# Patient Record
Sex: FEMALE | Race: WHITE
[De-identification: names, ages, dates, MRNs, and addresses within clinical notes are randomized per-mention and may not be internally consistent; named-entity substitution may affect disease eponyms.]

---

## 2019-12-01 ENCOUNTER — HOSPITAL ENCOUNTER (INPATIENT)
Dept: HOSPITAL 47 - EC | Age: 64
LOS: 1 days | Discharge: HOME | DRG: 247 | End: 2019-12-02
Attending: HOSPITALIST | Admitting: HOSPITALIST
Payer: COMMERCIAL

## 2019-12-01 VITALS — BODY MASS INDEX: 23.8 KG/M2

## 2019-12-01 DIAGNOSIS — E78.5: ICD-10-CM

## 2019-12-01 DIAGNOSIS — I21.4: Primary | ICD-10-CM

## 2019-12-01 DIAGNOSIS — Z83.2: ICD-10-CM

## 2019-12-01 DIAGNOSIS — Z91.040: ICD-10-CM

## 2019-12-01 DIAGNOSIS — Z98.51: ICD-10-CM

## 2019-12-01 DIAGNOSIS — Z79.02: ICD-10-CM

## 2019-12-01 DIAGNOSIS — Z85.3: ICD-10-CM

## 2019-12-01 DIAGNOSIS — Z88.5: ICD-10-CM

## 2019-12-01 DIAGNOSIS — I25.2: ICD-10-CM

## 2019-12-01 DIAGNOSIS — F17.200: ICD-10-CM

## 2019-12-01 DIAGNOSIS — I25.10: ICD-10-CM

## 2019-12-01 DIAGNOSIS — Z82.49: ICD-10-CM

## 2019-12-01 DIAGNOSIS — Z80.51: ICD-10-CM

## 2019-12-01 DIAGNOSIS — Z88.2: ICD-10-CM

## 2019-12-01 DIAGNOSIS — Z95.5: ICD-10-CM

## 2019-12-01 DIAGNOSIS — I10: ICD-10-CM

## 2019-12-01 LAB
ALBUMIN SERPL-MCNC: 4.3 G/DL (ref 3.5–5)
ALP SERPL-CCNC: 128 U/L (ref 38–126)
ALT SERPL-CCNC: 21 U/L (ref 9–52)
ANION GAP SERPL CALC-SCNC: 6 MMOL/L
APTT BLD: 26.1 SEC (ref 22–30)
AST SERPL-CCNC: 22 U/L (ref 14–36)
BASOPHILS # BLD AUTO: 0 K/UL (ref 0–0.2)
BASOPHILS NFR BLD AUTO: 0 %
BUN SERPL-SCNC: 15 MG/DL (ref 7–17)
CALCIUM SPEC-MCNC: 10.4 MG/DL (ref 8.4–10.2)
CHLORIDE SERPL-SCNC: 107 MMOL/L (ref 98–107)
CO2 SERPL-SCNC: 26 MMOL/L (ref 22–30)
EOSINOPHIL # BLD AUTO: 0.3 K/UL (ref 0–0.7)
EOSINOPHIL NFR BLD AUTO: 3 %
ERYTHROCYTE [DISTWIDTH] IN BLOOD BY AUTOMATED COUNT: 4.74 M/UL (ref 3.8–5.4)
ERYTHROCYTE [DISTWIDTH] IN BLOOD: 13.4 % (ref 11.5–15.5)
GLUCOSE BLD-MCNC: 123 MG/DL (ref 75–99)
GLUCOSE SERPL-MCNC: 106 MG/DL (ref 74–99)
HCT VFR BLD AUTO: 44.5 % (ref 34–46)
HGB BLD-MCNC: 14.8 GM/DL (ref 11.4–16)
INR PPP: 0.9 (ref ?–1.2)
LYMPHOCYTES # SPEC AUTO: 2.5 K/UL (ref 1–4.8)
LYMPHOCYTES NFR SPEC AUTO: 25 %
MAGNESIUM SPEC-SCNC: 2.1 MG/DL (ref 1.6–2.3)
MCH RBC QN AUTO: 31.2 PG (ref 25–35)
MCHC RBC AUTO-ENTMCNC: 33.3 G/DL (ref 31–37)
MCV RBC AUTO: 93.7 FL (ref 80–100)
MONOCYTES # BLD AUTO: 0.5 K/UL (ref 0–1)
MONOCYTES NFR BLD AUTO: 5 %
NEUTROPHILS # BLD AUTO: 6.4 K/UL (ref 1.3–7.7)
NEUTROPHILS NFR BLD AUTO: 65 %
PLATELET # BLD AUTO: 228 K/UL (ref 150–450)
POTASSIUM SERPL-SCNC: 4.2 MMOL/L (ref 3.5–5.1)
PROT SERPL-MCNC: 7.2 G/DL (ref 6.3–8.2)
PT BLD: 10 SEC (ref 9–12)
SODIUM SERPL-SCNC: 139 MMOL/L (ref 137–145)
WBC # BLD AUTO: 9.9 K/UL (ref 3.8–10.6)

## 2019-12-01 PROCEDURE — 36415 COLL VENOUS BLD VENIPUNCTURE: CPT

## 2019-12-01 PROCEDURE — 85730 THROMBOPLASTIN TIME PARTIAL: CPT

## 2019-12-01 PROCEDURE — 4A023N7 MEASUREMENT OF CARDIAC SAMPLING AND PRESSURE, LEFT HEART, PERCUTANEOUS APPROACH: ICD-10-PCS

## 2019-12-01 PROCEDURE — 027034Z DILATION OF CORONARY ARTERY, ONE ARTERY WITH DRUG-ELUTING INTRALUMINAL DEVICE, PERCUTANEOUS APPROACH: ICD-10-PCS

## 2019-12-01 PROCEDURE — 99285 EMERGENCY DEPT VISIT HI MDM: CPT

## 2019-12-01 PROCEDURE — 96365 THER/PROPH/DIAG IV INF INIT: CPT

## 2019-12-01 PROCEDURE — 71046 X-RAY EXAM CHEST 2 VIEWS: CPT

## 2019-12-01 PROCEDURE — 80053 COMPREHEN METABOLIC PANEL: CPT

## 2019-12-01 PROCEDURE — 83735 ASSAY OF MAGNESIUM: CPT

## 2019-12-01 PROCEDURE — 96375 TX/PRO/DX INJ NEW DRUG ADDON: CPT

## 2019-12-01 PROCEDURE — 96376 TX/PRO/DX INJ SAME DRUG ADON: CPT

## 2019-12-01 PROCEDURE — 93306 TTE W/DOPPLER COMPLETE: CPT

## 2019-12-01 PROCEDURE — B2111ZZ FLUOROSCOPY OF MULTIPLE CORONARY ARTERIES USING LOW OSMOLAR CONTRAST: ICD-10-PCS

## 2019-12-01 PROCEDURE — 80061 LIPID PANEL: CPT

## 2019-12-01 PROCEDURE — 83880 ASSAY OF NATRIURETIC PEPTIDE: CPT

## 2019-12-01 PROCEDURE — 85610 PROTHROMBIN TIME: CPT

## 2019-12-01 PROCEDURE — 84484 ASSAY OF TROPONIN QUANT: CPT

## 2019-12-01 PROCEDURE — 93005 ELECTROCARDIOGRAM TRACING: CPT

## 2019-12-01 PROCEDURE — 93458 L HRT ARTERY/VENTRICLE ANGIO: CPT

## 2019-12-01 PROCEDURE — 82565 ASSAY OF CREATININE: CPT

## 2019-12-01 PROCEDURE — 85025 COMPLETE CBC W/AUTO DIFF WBC: CPT

## 2019-12-01 RX ADMIN — MIDAZOLAM ONE MG: 1 INJECTION INTRAMUSCULAR; INTRAVENOUS at 11:42

## 2019-12-01 RX ADMIN — NITROGLYCERIN ONE MCG: 10 INJECTION INTRAVENOUS at 11:52

## 2019-12-01 RX ADMIN — MIDAZOLAM ONE MG: 1 INJECTION INTRAMUSCULAR; INTRAVENOUS at 10:51

## 2019-12-01 RX ADMIN — NITROGLYCERIN ONE MCG: 10 INJECTION INTRAVENOUS at 11:46

## 2019-12-01 NOTE — PTCA
PERCUTANEOUSTRANS CORORONARY ANGIOGRAPHY



DATE OF SERVICE:

December 1, 2019



PERFORMING PHYSICIAN:

Marquis Mejias M.D.



PROCEDURE PERFORMED:

Successful stenting of the PDA branch of the RCA using 2.5 x 12 mm Xience ANA with an

excellent angiographic result and reduction of stenosis from 99% to 0%.



INDICATION:

This is a 64-year-old female patient with coronary artery disease as well as

hypertension and dyslipidemia who presented to the hospital with chest discomfort and

ruled in for acute non-ST-elevation myocardial infarction.  She underwent a heart

catheterization by Dr. Adkins and was found to have critical disease involving the PDA

branch of the RCA.



Percutaneous coronary intervention of the RCA was advised.



APPROACH:

Right common femoral artery.



COMPLICATION:

None.



LEVEL OF SEDATION:

Moderate with sedation length of 24 minutes.



PROCEDURE DESCRIPTION:

Please refer to the diagnostic heart catheterization that was performed by Dr. Adkins

earlier today.



Anticoagulation was initiated using Angiomax.  Subsequently I did engage the RCA using

JR4 guide.  I did wire the PDA branch using a Whisper J wire.  Predilatation was

performed using 2.0 x 8 mm balloon before I deployed a 2.5 x 12 mm Xience ANA where the

stent was positioned under fluoroscopy guidance and deployed under 20 atmospheres for

30 seconds.  The following angiogram showed excellent angiographic results with

reduction of stenosis from 99% to 0%.  The procedure was completed without any

complication.



POSTPROCEDURE MANAGEMENT:

1. Dual anti-platelet therapy.

2. Risk factors modifications.

3. Follow up with the patient.





MMJESSICA / JEFFERYN: [id number] / Job#: [id number]

## 2019-12-01 NOTE — XR
EXAMINATION TYPE: XR chest 2V

 

DATE OF EXAM: 12/1/2019

 

COMPARISON: NONE

 

HISTORY: Chest pain

 

TECHNIQUE:  Frontal and lateral views of the chest are obtained.

 

FINDINGS:  Heart and mediastinum are normal. Lungs are clear. Diaphragm is normal. The bony thorax is
 intact.

 

IMPRESSION:  Normal chest.

## 2019-12-01 NOTE — CONS
CONSULTATION



CHIEF COMPLAINT:

Chest pain.



Ms. Garcia is a 64-year-old lady with history of coronary artery disease, status post

prior angioplasty many years ago at [facility], who does not follow with a

physician regularly and does not take any medications.  Comes in complaining of chest

pain.  She describes it as mild to moderate precordial chest pressure with left arm

discomfort.  It comes on with exertion and is relieved with rest.  The chest pain was

coming on when she was going up a flight of stairs.  On her initial presentation, the

EKG showed sinus tachycardia with ST-T wave changes suggestive of ischemia.  Had a

chest x-ray that was unremarkable and her labs showed that the troponin was elevated at

0.04.  Creatinine was normal at 0.8 and hemoglobin was normal at 14.8.  Given her

typical symptoms and the elevated troponin, suggestive of acute non ST-segment

elevation MI, I advised the patient to undergo cardiac catheterization.  She had been

explained of risks, benefits and alternatives, understood and accepted.



PAST MEDICAL HISTORY:

Significant for coronary artery disease.



MEDICATIONS:

Include vitamin D, calcium, and Tylenol.



ALLERGIES:

THE PATIENT IS ALLERGIC TO LATEX, SULFA, TRAMADOL AND PRESSURE BANDAGE.



FAMILY HISTORY:

Negative for premature coronary artery disease.



SOCIAL HISTORY:

Significant for smoking, EtOH abuse or drug abuse.



REVIEW OF SYSTEMS:

HEENT is unremarkable. Cardiac as described above.  Respiratory as described above.  GI

negative. : Negative. Allergy/Immunology: Negative.

SKIN: Negative.  Musculoskeletal significant for arthritis.  Psychosocial negative.

Endocrine negative.  Hematological negative.  Derm negative.  Constitutional negative.

Oncological negative.



Rest of the system review is not relevant.



PHYSICAL EXAM:

Comfortable at rest.  Vital signs are stable.  There is no jugular venous distention.

Carotid upstroke is normal.  There is no bruit.  Chest exam reveals good air entry

bilaterally.  Heart exam reveals first and second heart sounds.  Systolic murmur at the

left lower sternal border.  Abdomen is soft.  Exam of extremities did not reveal edema.

Peripheral pulses are felt.



ASSESSMENT:

Acute non ST-segment elevation myocardial infarction.



PLAN:

Patient will undergo cardiac catheterization for further evaluation.  She will be

treated with aspirin, beta blockers, nitrates and statin.  I will check a lipid profile

on her.





MMODL / IJN: [id number] / Job#: [id number]

## 2019-12-01 NOTE — P.HPIM
History of Present Illness


H&P Date: 12/01/19


Chief Complaint: Chest pain





Mrs. Garcia to 64-year-old female with a past medical history of coronary 

artery disease status post stenting in 2009, breast cancer in remission coming 

to the hospital with a chief complaint of substernal chest pain.  Patient states

that she has been having chest pain substernal mostly on the left side squeezing

type of pain that is radiating to her left arm and shoulder for the past 3 days.

 Over the past 2-3 hours patient pain was very severe that she had to come into 

the emergency department.  Patient states that she had similar kind of pain when

she was diagnosed with MI in 2009 and had stenting.  She states that she was 

taking aspirin for a few years but currently is not taking any medications.  

Patient denies having any shortness of breath, diaphoresis nausea and vomiting 

with the chest pains.  Patient denies having any cough or difficulty breathing. 

No abdominal pain nausea vomiting or diarrhea.  No dysuria or hematuria.  No 

headaches dizziness or loss of consciousness or syncopal episodes.





In the emergency patient had labs done showing an elevated troponin at 0.044, so

cardiology was consulted.  Patient was taken for cardiac catheterization and 

stenting of the right coronary artery was done by Dr. Sears.  Currently the 

patient is on the general medical floors, eating her dinner.  Patient is chest 

pain-free.











Review of Systems





REVIEW OF SYSTEMS:


PSYCH: No anxiety or depression


NEURO:No c/o weakness of the extremties, No facial droop, No speech 

abnormalities.


VASCULAR: Peripheral nervous system within the normal limits no edema


HEMATOLOGIC: No history of easy bleeding and bruising .  No recent infections .


RESPIRATORY: No cough, No SOB, No chest discomfort. 


IMMUNE: No infections


INTEGUMENT: no rashes


OPHTHALMOLOGIC: No blurry vision and no eye discharge


: No dysuria or hematuria


GYN: No bleeding PV


CARDIAC: As per HPI


MUSCULOSKELETAL : No Aches or pains in the joints or muscles. 


GI: No abdominal pain, Nausea or vomiting. No constipation or diarrhea. 








All 13 review of systems are negative except for the ones mentioned above





Past Medical History


Past Medical History: Coronary Artery Disease (CAD), Cancer


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 No LEFT 

ARM


History of Any Multi-Drug Resistant Organisms: None Reported


Past Surgical History: Breast Surgery, Heart Catheterization With Stent, 

Tonsillectomy, Tubal Ligation


Additional Past Surgical History / Comment(s): D & C. LEFT STEREOTACTIC  BREAST 

BX  (3/18/16)


Past Anesthesia/Blood Transfusion Reactions: No Reported Reaction


Additional Past Anesthesia/Blood Transfusion Reaction / Comment(s): HAS NEVER 

HAD GENERAL ANESTHESIA


Date of Last Stent Placement:: 2009


Past Psychological History: No Psychological Hx Reported


Smoking Status: Current every day smoker


Past Alcohol Use History: Rare


Past Drug Use History: None Reported





- Past Family History


  ** Father


Additional Family Medical History / Comment(s): HEMPHILIA A.





  ** Daughter(s)


Additional Family Medical History / Comment(s): HEMOPHILIA A





  ** Mother


Family Medical History: Cancer, Coronary Artery Disease (CAD)


Additional Family Medical History / Comment(s): KIDNEY CANCER





Medications and Allergies


                                Home Medications











 Medication  Instructions  Recorded  Confirmed  Type


 


Acetaminophen/Diphenhydramine 2 tab PO HS PRN 12/01/19 12/01/19 History





[Tylenol -25mg]    


 


Calcium Carbonate [Calcium] 1,200 mg PO DAILY 12/01/19 12/01/19 History


 


Cholecalciferol [Vitamin D3 (25 1,000 unit PO DAILY 12/01/19 12/01/19 History





Mcg = 1000 Iu)]    








                                    Allergies











Allergy/AdvReac Type Severity Reaction Status Date / Time


 


Latex, Natural Rubber Allergy  Rash/Hives Verified 12/01/19 07:41


 


Sulfa (Sulfonamide Allergy  Rash/Hives. Verified 12/01/19 07:41





Antibiotics)   ITCHING  


 


tramadol AdvReac  Nausea & Verified 12/01/19 07:41





   Vomiting  


 


pressure bandage Allergy  Itching/soraya Uncoded 12/01/19 07:41





   h  














Physical Exam


Vitals: 


                                   Vital Signs











  Temp Pulse Pulse Resp BP BP Pulse Ox


 


 12/01/19 15:22  98 F   70  16   96/60  98


 


 12/01/19 14:05    61  16   93/58 


 


 12/01/19 13:35    65  16   97/66 


 


 12/01/19 13:05    76  16   111/68  99


 


 12/01/19 12:50    76  16   115/72  96


 


 12/01/19 12:35    74  16   97/60  98


 


 12/01/19 12:20  97.7 F   65  16   100/65  97


 


 12/01/19 08:00  97.8 F   77  16   122/76  96


 


 12/01/19 04:37   88   18  155/60   98


 


 12/01/19 04:00     18   


 


 12/01/19 03:54   82   18  151/84   97


 


 12/01/19 03:41  97.8 F   90  18   155/81  97


 


 12/01/19 03:25   92   18  123/82   98


 


 12/01/19 02:20  97.8 F  108 H   18  169/92   99








                                Intake and Output











 12/01/19 12/01/19 12/01/19





 06:59 14:59 22:59


 


Intake Total  270 


 


Balance  270 


 


Intake:   


 


  IV  70 


 


  Oral  200 


 


Other:   


 


  Voiding Method Toilet Toilet 


 


  # Voids  2 


 


  Weight 65.1 kg  65.1 kg














GEN. APPEARANCE: alert, in no apparent distress


HEAD EXAM:  atraumatic, normocephalic, normal inspection


EYE EXAM: normal appearance, PERRL, EOMI. no pallor.  No icterus.


ENT EXAM:  normal exam, mucous membranes moist


NECK EXAM:  normal inspection.  No lymphadenopathy


RESPIRATORY EXAM:  normal lung sounds bilaterally.  No wheezing or crackles


CARDIOVASCULAR EXAM:  regular rate, normal rhythm, normal heart sounds.  No 

additional sounds


GI/ABDOMINAL EXAM: soft, normal bowel sounds.  No guarding or rigidity.  

Nontender


EXTREMITIES EXAM: No pedal edema.  No calf tenderness


BACK EXAM: normal inspection


NEUROLOGICAL EXAM:  alert, oriented X3, no focal neurological deficits


PSYCHIATRIC EXAM:  normal affect, normal mood


SKIN EXAM:  warm, dry, intact, normal color.  Absent: rash





Results


CBC & Chem 7: 


                                 12/01/19 02:37





                                 12/01/19 02:37


Labs: 


                  Abnormal Lab Results - Last 24 Hours (Table)











  12/01/19 12/01/19 12/01/19 Range/Units





  02:37 02:37 09:38 


 


APTT    101.8 H*  (22.0-30.0)  sec


 


Glucose  106 H    (74-99)  mg/dL


 


Calcium  10.4 H    (8.4-10.2)  mg/dL


 


Alkaline Phosphatase  128 H    ()  U/L


 


Troponin I   0.044 H*   (0.000-0.034)  ng/mL














  12/01/19 Range/Units





  09:38 


 


APTT   (22.0-30.0)  sec


 


Glucose   (74-99)  mg/dL


 


Calcium   (8.4-10.2)  mg/dL


 


Alkaline Phosphatase   ()  U/L


 


Troponin I  0.077 H*  (0.000-0.034)  ng/mL














Thrombosis Risk Factor Assmnt





- Choose All That Apply


Each Risk Factor Represents 2 Points: Age 61-74 years


Thrombosis Risk Factor Assessment Total Risk Factor Score: 2


Thrombosis Risk Factor Assessment Level: Low Risk





Assessment and Plan


Assessment: 





ASSESSMENT








Non-ST elevation MI


Coronary artery disease


Hypertension


History of breast cancer in remission


Nicotine dependence








PLAN: Patient had cardiac catheterization done and stenting of the RCA by Dr. Sears today.  Patient has been started on aspirin and Plavix.  Patient was 

extensively counseled on smoking cessation.  Further recommendations to follow 

depending on the progress of the patient.

## 2019-12-01 NOTE — CC
CARDIAC CATHETERIZATION REPORT



INDICATION:

Acute non ST-segment elevation MI.



PROCEDURE NOTE:

After obtaining informed consent, left heart catheterization, coronary angiogram are

performed via the right femoral artery using standard Graeme catheters.  Patient

tolerated the procedure well without any obvious immediate complications.  Patient

received moderate conscious sedation and total sedation time was 16 minutes.



FINDINGS:



HEMODYNAMICS:

Left ventricular end-diastolic pressure is 14 mm.  There is no significant gradient

across the aortic valve.



LEFT VENTRICULOGRAM:

Left ventriculogram is not performed.



ANGIOGRAPHIC DATA:

LEFT MAIN CORONARY ARTERY:  Left main coronary artery is a normal-sized vessel and is

free of stenosis.  Divides into left anterior descending coronary artery and circumflex

coronary artery.



CIRCUMFLEX CORONARY ARTERY: The circumflex coronary artery was previously stented and

the stent appears patent.



LEFT ANTERIOR DESCENDING CORONARY ARTERY: LAD gives off a large caliber diagonal branch

that has 60-70% ostial stenosis.  The _____ coronaries are calcified.  LAD itself has a

mild-to-moderate atherosclerotic plaque.



RIGHT CORONARY ARTERY: Right coronary artery is a large dominant vessel that shows

irregular ectatic lesions in the proximal one third of the artery.  It is calcified

distally as it bifurcates into PLV and PDA.  Right at the origin of the PDA,  there is

a 99% stenosis noted.  The PDA itself bifurcates further.



CONCLUSIONS:

1. Patent stent within the circumflex coronary artery.

2. Mild to moderate nonfocal disease involving LAD, ostial diagonal lesion.

3. Critical stenosis involving the ostial portion of the PDA.



PLAN:

Patient's symptoms and elevated troponin are probably related to the lesion in the

right coronary artery.  She will undergo angioplasty with stent placement of the same.





MMODL / IJN: [id number] / Job#: [id number]

## 2019-12-01 NOTE — ED
Chest Pain HPI





- General


Chief Complaint: Chest Pain


Stated Complaint: Chest Pain


Time Seen by Provider: 12/01/19 02:31


Source: patient


Mode of arrival: ambulatory


Limitations: no limitations





- History of Present Illness


Initial Comments: 


64-year-old female with history of previous stents presents today for chief 

complaint chest pressure, left arm pain on off x 3 days increasing past 3 hours.

 64-year-old female that extensive cardiac history presenting to emergency 

department today for chief complaint of chest pressure left arm pain similar to 

that which she's had myocardial infarction in the past.  Upon arrival patient's 

blood pressure stable she denies any back pain epigastric pain fevers cough 

congestion or any other associated symptoms.  Patient denies any leg swelling.  

Remaining review of system negative patient describes the chest pain as a 

pressure localized in the chest center. 








- Related Data


                                Home Medications











 Medication  Instructions  Recorded  Confirmed


 


Cetirizine HCl [Zyrtec] 10 mg PO DAILY PRN 04/20/16 05/05/16


 


Naproxen Sodium [Aleve] 220 - 440 mg PO Q4-6H PRN 04/20/16 05/05/16








                                  Previous Rx's











 Medication  Instructions  Recorded


 


Docusate [Colace] 100 mg PO BID #30 capsule 04/21/16


 


Hydrocodone/Acetaminophen [Norco 1 each PO Q6HR PRN #30 tab 04/21/16





5-325]  


 


Docusate [Colace] 100 mg PO BID #30 capsule 05/09/16


 


Hydrocodone/Acetaminophen [Norco 1 each PO Q6HR PRN #30 tab 05/09/16





5-325]  











                                    Allergies











Allergy/AdvReac Type Severity Reaction Status Date / Time


 


Latex, Natural Rubber Allergy  Rash/Hives Verified 12/01/19 04:41


 


Sulfa (Sulfonamide Allergy  Rash/Hives. Verified 04/21/16 07:36





Antibiotics)   ITCHING  


 


tramadol AdvReac  Nausea & Verified 12/01/19 02:30





   Vomiting  


 


pressure bandage Allergy  Itching/soraya Uncoded 05/05/16 14:24





   h  














Review of Systems


ROS Statement: 


Those systems with pertinent positive or pertinent negative responses have been 

documented in the HPI.





ROS Other: All systems not noted in ROS Statement are negative.





EKG Findings





- EKG Comments:


EKG Findings:: Initial EKG obtained at 2:30 AM revealing ventricular rate 103 

bpm, CO interval 140 ms, to administration 66 ms, QT/QTc 350/458 ms.  Slight 

depression noted in V5, no ST elevation noted.  2nd EKG resolution of slight 

depression in V5, normal sinus no acute changes. No ST elevation or depression





Past Medical History


Past Medical History: Coronary Artery Disease (CAD), Cancer


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 No LEFT 

ARM


History of Any Multi-Drug Resistant Organisms: None Reported


Past Surgical History: Breast Surgery, Heart Catheterization With Stent, 

Tonsillectomy, Tubal Ligation


Additional Past Surgical History / Comment(s): D & C. LEFT STEREOTACTIC  BREAST 

BX  (3/18/16)


Past Anesthesia/Blood Transfusion Reactions: No Reported Reaction


Additional Past Anesthesia/Blood Transfusion Reaction / Comment(s): HAS NEVER 

HAD GENERAL ANESTHESIA


Date of Last Stent Placement:: 2009


Past Psychological History: No Psychological Hx Reported


Smoking Status: Current every day smoker


Past Alcohol Use History: Rare


Past Drug Use History: None Reported





- Past Family History


  ** Father


Additional Family Medical History / Comment(s): HEMPHILIA A.





  ** Daughter(s)


Additional Family Medical History / Comment(s): HEMOPHILIA A





  ** Mother


Family Medical History: Cancer, Coronary Artery Disease (CAD)


Additional Family Medical History / Comment(s): KIDNEY CANCER





General Exam


Limitations: no limitations





Course


                                   Vital Signs











  12/01/19 12/01/19 12/01/19





  02:20 03:25 03:41


 


Temperature 97.8 F  97.8 F


 


Pulse Rate 108 H 92 


 


Pulse Rate [   90





Pulse Oximetery   





]   


 


Respiratory 18 18 18





Rate   


 


Blood Pressure 169/92 123/82 


 


Blood Pressure   155/81





[Right Arm]   


 


O2 Sat by Pulse 99 98 97





Oximetry   














  12/01/19





  03:54


 


Temperature 


 


Pulse Rate 82


 


Pulse Rate [ 





Pulse Oximetery 





] 


 


Respiratory 18





Rate 


 


Blood Pressure 151/84


 


Blood Pressure 





[Right Arm] 


 


O2 Sat by Pulse 97





Oximetry 














Chest Pain MDM





- MDM


64-year-old female presenting today for chest pressure significant cardiac 

history.  EKG no ST elevation.  Initial troponin slightly elevated will trend.  

Concern for non-ST elevation MI.  Patient was started on low intensity heparin. 

Patient pain free after 2 nitroglycerine and dilaudid, refused morphine. VS 

stable. Patient will be admitted for NSTEMI, cardiac cath. Admitted to [facility]. 

Patient agreeable with care plan and admission. Case discussed with my attending

Dr. Tsai who is agreeable with care plan.








Disposition


Clinical Impression: 


 NSTEMI (non-ST elevated myocardial infarction), Chest pressure





Disposition: ADMITTED AS IP TO THIS HOSP


Condition: Serious


Is patient prescribed a controlled substance at d/c from ED?: No


Time of Disposition: 03:23


Decision to Admit Reason: Admit from EC


Decision Date: 12/01/19


Decision Time: 03:23

## 2019-12-02 VITALS
HEART RATE: 54 BPM | SYSTOLIC BLOOD PRESSURE: 96 MMHG | TEMPERATURE: 98 F | DIASTOLIC BLOOD PRESSURE: 62 MMHG | RESPIRATION RATE: 20 BRPM

## 2019-12-02 LAB
BASOPHILS # BLD AUTO: 0 K/UL (ref 0–0.2)
BASOPHILS NFR BLD AUTO: 1 %
CHOLEST SERPL-MCNC: 194 MG/DL (ref ?–200)
EOSINOPHIL # BLD AUTO: 0.3 K/UL (ref 0–0.7)
EOSINOPHIL NFR BLD AUTO: 6 %
ERYTHROCYTE [DISTWIDTH] IN BLOOD BY AUTOMATED COUNT: 4.46 M/UL (ref 3.8–5.4)
ERYTHROCYTE [DISTWIDTH] IN BLOOD: 13.4 % (ref 11.5–15.5)
HCT VFR BLD AUTO: 41.4 % (ref 34–46)
HDLC SERPL-MCNC: 50 MG/DL (ref 40–60)
HGB BLD-MCNC: 13.8 GM/DL (ref 11.4–16)
LDLC SERPL CALC-MCNC: 119 MG/DL (ref 0–99)
LYMPHOCYTES # SPEC AUTO: 0.6 K/UL (ref 1–4.8)
LYMPHOCYTES NFR SPEC AUTO: 13 %
MCH RBC QN AUTO: 31.1 PG (ref 25–35)
MCHC RBC AUTO-ENTMCNC: 33.4 G/DL (ref 31–37)
MCV RBC AUTO: 92.9 FL (ref 80–100)
MONOCYTES # BLD AUTO: 0.3 K/UL (ref 0–1)
MONOCYTES NFR BLD AUTO: 7 %
NEUTROPHILS # BLD AUTO: 3.5 K/UL (ref 1.3–7.7)
NEUTROPHILS NFR BLD AUTO: 72 %
PLATELET # BLD AUTO: 182 K/UL (ref 150–450)
TRIGL SERPL-MCNC: 127 MG/DL (ref ?–150)
WBC # BLD AUTO: 4.9 K/UL (ref 3.8–10.6)

## 2019-12-02 NOTE — P.DS
Providers


Date of admission: 


12/01/19 11:58





Expected date of discharge: 12/02/19


Attending physician: 


Brenda Celestin





Consults: 





                                        





12/01/19 03:23


Consult Physician Urgent 


   Consulting Provider: Marquis Mejias


   Consult Reason/Comments: chest pressure


   Do you want consulting provider notified?: Yes





12/01/19 12:02


Consult Physician Routine 


   Consulting Provider: [facility]


   Consult Reason/Comments: Post Interventional patient


   Do you want consulting provider notified?: Already Contacted











Primary care physician: 


Stated None





Hospital Course: 





Mrs. Garcia to 64-year-old female with a past medical history of coronary 

artery disease status post stenting in 2009, breast cancer in remission coming 

to the hospital with a chief complaint of substernal chest pain.  Patient states

that she has been having chest pain substernal mostly on the left side squeezing

type of pain that is radiating to her left arm and shoulder for the past 3 days.

 Over the past 2-3 hours patient pain was very severe that she had to come into 

the emergency department.  Patient states that she had similar kind of pain when

she was diagnosed with MI in 2009 and had stenting.  She states that she was zayra

ing aspirin for a few years but currently is not taking any medications.  

Patient denies having any shortness of breath, diaphoresis nausea and vomiting 

with the chest pains.  Patient denies having any cough or difficulty breathing. 

No abdominal pain nausea vomiting or diarrhea.  No dysuria or hematuria.  No 

headaches dizziness or loss of consciousness or syncopal episodes.





In the emergency patient had labs done showing an elevated troponin at 0.044, so

cardiology was consulted.  Patient was taken for cardiac catheterization and 

stenting of the right coronary artery was done by Dr. Sears on 12/01/19. 





Patient is cleared by cardiology to be discharged home.  She is being discharged

on aspirin, Plavix, statin and beta-blocker.  Discussed in detail with the 

patient the importance of taking all her medications especially antiplatelet 

therapy due to recent stenting.  Again stressed the importance of smoking 

cessation.











DISCHARGE DIAGNOSIS





Non-ST elevation MI


Coronary artery disease


Hypertension


History of breast cancer in remission


Nicotine dependence





Follow-up: Patient is advised to follow-up with cardiology  OP  as scheduled.


Patient Condition at Discharge: Stable





Plan - Discharge Summary


Discharge Rx Participant: No


New Discharge Prescriptions: 


New


   Aspirin 81 mg PO DAILY  tab


   Atorvastatin [Lipitor] 40 mg PO DAILY #90 tab


   Metoprolol Tartrate [Lopressor] 25 mg PO BID #180 tab


   Nitroglycerin Sl Tabs [Nitrostat] 0.4 mg SUBLINGUAL Q5M PRN #25 tab


     PRN Reason: Chest Pain


   Clopidogrel [Plavix] 75 mg PO DAILY #90 tab





No Action


   Cholecalciferol [Vitamin D3 (25 Mcg = 1000 Iu)] 1,000 unit PO DAILY


   Calcium Carbonate [Calcium] 1,200 mg PO DAILY


   Acetaminophen/Diphenhydramine [Tylenol -25mg] 2 tab PO HS PRN


     PRN Reason: sleep/pain


Discharge Medication List





Acetaminophen/Diphenhydramine [Tylenol -25mg] 2 tab PO HS PRN 12/01/19 

[History]


Calcium Carbonate [Calcium] 1,200 mg PO DAILY 12/01/19 [History]


Cholecalciferol [Vitamin D3 (25 Mcg = 1000 Iu)] 1,000 unit PO DAILY 12/01/19 

[History]


Aspirin 81 mg PO DAILY  tab 12/02/19 [Rx]


Atorvastatin [Lipitor] 40 mg PO DAILY #90 tab 12/02/19 [Rx]


Clopidogrel [Plavix] 75 mg PO DAILY #90 tab 12/02/19 [Rx]


Metoprolol Tartrate [Lopressor] 25 mg PO BID #180 tab 12/02/19 [Rx]


Nitroglycerin Sl Tabs [Nitrostat] 0.4 mg SUBLINGUAL Q5M PRN #25 tab 12/02/19 

[Rx]








Follow up Appointment(s)/Referral(s): 


None,Stated [Primary Care Provider] - 1-2 days (Please call and schedule a 

follow up appointment with a primary care doctor)


Cem Adkins MD [STAFF PHYSICIAN] - 12/10/19 3:30 pm (Tuesday)


Patient Instructions/Handouts:  *[facility] - After Heart Catheterization - 

Ambulatory Care Instructions, How to Stop Smoking (DC), Heart Healthy Diet (DC)


Discharge Disposition: HOME SELF-CARE

## 2019-12-02 NOTE — ECHOF
Referral Reason:mi



MEASUREMENTS

--------

HEIGHT: 165.1 cm

WEIGHT: 65.8 kg

BP: 102/63

RVIDd:   3.0 cm     (< 3.3)

IVSd:   1.1 cm     (0.6 - 1.1)

LVIDd:   3.2 cm     (3.9 - 5.3)

LVPWd:   1.2 cm     (0.6 - 1.1)

IVSs:   1.5 cm

LVIDs:   2.0 cm

LVPWs:   1.5 cm

LAESV Index (A-L):   21.47 ml/m

Ao Diam:   2.9 cm     (2.0 - 3.7)

AV Cusp:   1.8 cm     (1.5 - 2.6)

LA Diam:   3.8 cm     (2.7 - 3.8)

MV EXCURSION:   15.293 mm     (> 18.000)

MV EF SLOPE:   71 mm/s     (70 - 150)

EPSS:   0.6 cm

MV E Rizwan:   0.71 m/s

MV DecT:   277 ms

MV A Rizwan:   0.57 m/s

MV E/A Ratio:   1.24 

RAP:   5.00 mmHg

RVSP:   21.70 mmHg







FINDINGS

--------

Resting bradycardia (HR<60bpm).

This was a technically good study.

The left ventricular size is normal.   There is borderline concentric left ventricular hypertrophy.  
 Overall left ventricular systolic function is normal with, an EF between 55 - 60 %.   The diastolic 
filling pattern is normal for the age of the patient 8.21.

The right ventricle is normal in size.

Normal LA  size by volume 22+/-6 ml/m2.

The right atrial size is normal.

Interatrial and interventricular septum intact.

There is no evidence of aortic regurgitation.   There is no evidence of aortic stenosis.

Mild mitral regurgitation is present.

Mild tricuspid regurgitation present.   There is no evidence of pulmonary hypertension.   The right v
entricular systolic pressure, as measured by Doppler, is 21.70mmHg.

There is no pulmonic regurgitation present.

The aortic root size is normal.

The inferior vena cava is mildly dilated.

There is no pericardial effusion.



CONCLUSIONS

--------

1. Resting bradycardia (HR<60bpm).

2. This was a technically good study.

3. The left ventricular size is normal.

4. There is borderline concentric left ventricular hypertrophy.

5. Overall left ventricular systolic function is normal with, an EF between 55 - 60 %.

6. The diastolic filling pattern is normal for the age of the patient 8.21

7. The right ventricle is normal in size.

8. Normal LA size by volume 22+/-6 ml/m2.

9. The right atrial size is normal.

10. Interatrial and interventricular septum intact.

11. There is no evidence of aortic regurgitation.

12. There is no evidence of aortic stenosis.

13. Mild mitral regurgitation is present.

14. Mild tricuspid regurgitation present.

15. There is no evidence of pulmonary hypertension.

16. The right ventricular systolic pressure, as measured by Doppler, is 21.70mmHg.

17. There is no pulmonic regurgitation present.

18. The aortic root size is normal.

19. The inferior vena cava is mildly dilated.

20. There is no pericardial effusion.





SONOGRAPHER: Ashley Dumont RDCS

## 2019-12-02 NOTE — PN
PROGRESS NOTE



Mrs. Garcia is a 64-year-old female who presented with non ST-segment elevation

myocardial infarction, underwent cardiac catheterization, was found to have significant

obstructive disease involving the right PDA, underwent stenting of that vessel.  She is

doing well this morning.  She is denying any chest pain, ambulating without difficulty

denying any dizziness or palpitation.  She denies any nausea.



She continues to be on aspirin once a day, Plavix 75 mg daily, _____ on a p.r.n. basis.



PHYSICAL EXAMINATION:

Blood pressure 102/60 with a heart rate in the 80s.

LUNGS:  Clear.

HEART:  Regular rate and rhythm, S1, S2.  No S3.  No rub.

ABDOMEN:  Soft, nontender.

EXTREMITIES:  No edema, right groin no hematoma.



EKG revealed no acute changes.



IMPRESSION:

1. Status post stenting of the right PDA in the setting of non STEMI.

2. Chronic tobacco use.

3. Hyperlipidemia.



RECOMMENDATION:

The patient will be discharged home today.  She is not on an ACE inhibitor because of

the low blood pressure.  I will start her on a low-dose beta blocker and statin.  She

will follow up with Dr. Adkins as an outpatient.





MMODL / IJN: [id number] / Job#: [id number]

## 2020-03-12 ENCOUNTER — HOSPITAL ENCOUNTER (INPATIENT)
Dept: HOSPITAL 47 - EC | Age: 65
LOS: 2 days | Discharge: HOME | DRG: 811 | End: 2020-03-14
Attending: INTERNAL MEDICINE | Admitting: INTERNAL MEDICINE
Payer: COMMERCIAL

## 2020-03-12 VITALS — BODY MASS INDEX: 25.2 KG/M2

## 2020-03-12 DIAGNOSIS — Z91.040: ICD-10-CM

## 2020-03-12 DIAGNOSIS — Z14.01: ICD-10-CM

## 2020-03-12 DIAGNOSIS — Z95.5: ICD-10-CM

## 2020-03-12 DIAGNOSIS — I25.2: ICD-10-CM

## 2020-03-12 DIAGNOSIS — K59.09: ICD-10-CM

## 2020-03-12 DIAGNOSIS — D66: ICD-10-CM

## 2020-03-12 DIAGNOSIS — Z82.49: ICD-10-CM

## 2020-03-12 DIAGNOSIS — I25.10: ICD-10-CM

## 2020-03-12 DIAGNOSIS — Z79.82: ICD-10-CM

## 2020-03-12 DIAGNOSIS — K29.60: ICD-10-CM

## 2020-03-12 DIAGNOSIS — M19.042: ICD-10-CM

## 2020-03-12 DIAGNOSIS — E78.5: ICD-10-CM

## 2020-03-12 DIAGNOSIS — Z92.3: ICD-10-CM

## 2020-03-12 DIAGNOSIS — K29.80: ICD-10-CM

## 2020-03-12 DIAGNOSIS — M19.041: ICD-10-CM

## 2020-03-12 DIAGNOSIS — D50.9: Primary | ICD-10-CM

## 2020-03-12 DIAGNOSIS — I10: ICD-10-CM

## 2020-03-12 DIAGNOSIS — Z88.8: ICD-10-CM

## 2020-03-12 DIAGNOSIS — Z85.3: ICD-10-CM

## 2020-03-12 DIAGNOSIS — Z80.51: ICD-10-CM

## 2020-03-12 DIAGNOSIS — Z79.02: ICD-10-CM

## 2020-03-12 DIAGNOSIS — Z88.2: ICD-10-CM

## 2020-03-12 DIAGNOSIS — Z79.899: ICD-10-CM

## 2020-03-12 DIAGNOSIS — Z87.891: ICD-10-CM

## 2020-03-12 DIAGNOSIS — K20.9: ICD-10-CM

## 2020-03-12 LAB
ANION GAP SERPL CALC-SCNC: 7 MMOL/L
BASOPHILS # BLD AUTO: 0 K/UL (ref 0–0.2)
BASOPHILS NFR BLD AUTO: 0 %
BUN SERPL-SCNC: 15 MG/DL (ref 7–17)
CALCIUM SPEC-MCNC: 8.8 MG/DL (ref 8.4–10.2)
CHLORIDE SERPL-SCNC: 109 MMOL/L (ref 98–107)
CO2 SERPL-SCNC: 23 MMOL/L (ref 22–30)
EOSINOPHIL # BLD AUTO: 0.4 K/UL (ref 0–0.7)
EOSINOPHIL NFR BLD AUTO: 4 %
ERYTHROCYTE [DISTWIDTH] IN BLOOD BY AUTOMATED COUNT: 3.7 M/UL (ref 3.8–5.4)
ERYTHROCYTE [DISTWIDTH] IN BLOOD: 17.8 % (ref 11.5–15.5)
GLUCOSE SERPL-MCNC: 85 MG/DL (ref 74–99)
HCT VFR BLD AUTO: 26.9 % (ref 34–46)
HGB BLD-MCNC: 7.4 GM/DL (ref 11.4–16)
LYMPHOCYTES # SPEC AUTO: 1.1 K/UL (ref 1–4.8)
LYMPHOCYTES NFR SPEC AUTO: 12 %
MAGNESIUM SPEC-SCNC: 2 MG/DL (ref 1.6–2.3)
MCH RBC QN AUTO: 20 PG (ref 25–35)
MCHC RBC AUTO-ENTMCNC: 27.5 G/DL (ref 31–37)
MCV RBC AUTO: 72.8 FL (ref 80–100)
MONOCYTES # BLD AUTO: 0.6 K/UL (ref 0–1)
MONOCYTES NFR BLD AUTO: 6 %
NEUTROPHILS # BLD AUTO: 6.9 K/UL (ref 1.3–7.7)
NEUTROPHILS NFR BLD AUTO: 75 %
PLATELET # BLD AUTO: 231 K/UL (ref 150–450)
POTASSIUM SERPL-SCNC: 4.6 MMOL/L (ref 3.5–5.1)
SODIUM SERPL-SCNC: 139 MMOL/L (ref 137–145)
WBC # BLD AUTO: 9.1 K/UL (ref 3.8–10.6)

## 2020-03-12 PROCEDURE — 83735 ASSAY OF MAGNESIUM: CPT

## 2020-03-12 PROCEDURE — 83021 HEMOGLOBIN CHROMOTOGRAPHY: CPT

## 2020-03-12 PROCEDURE — 71046 X-RAY EXAM CHEST 2 VIEWS: CPT

## 2020-03-12 PROCEDURE — 82272 OCCULT BLD FECES 1-3 TESTS: CPT

## 2020-03-12 PROCEDURE — 86901 BLOOD TYPING SEROLOGIC RH(D): CPT

## 2020-03-12 PROCEDURE — 96374 THER/PROPH/DIAG INJ IV PUSH: CPT

## 2020-03-12 PROCEDURE — 86900 BLOOD TYPING SEROLOGIC ABO: CPT

## 2020-03-12 PROCEDURE — 85379 FIBRIN DEGRADATION QUANT: CPT

## 2020-03-12 PROCEDURE — 86850 RBC ANTIBODY SCREEN: CPT

## 2020-03-12 PROCEDURE — 43235 EGD DIAGNOSTIC BRUSH WASH: CPT

## 2020-03-12 PROCEDURE — 36415 COLL VENOUS BLD VENIPUNCTURE: CPT

## 2020-03-12 PROCEDURE — 93005 ELECTROCARDIOGRAM TRACING: CPT

## 2020-03-12 PROCEDURE — 86920 COMPATIBILITY TEST SPIN: CPT

## 2020-03-12 PROCEDURE — 80048 BASIC METABOLIC PNL TOTAL CA: CPT

## 2020-03-12 PROCEDURE — 45378 DIAGNOSTIC COLONOSCOPY: CPT

## 2020-03-12 PROCEDURE — 99285 EMERGENCY DEPT VISIT HI MDM: CPT

## 2020-03-12 PROCEDURE — 82728 ASSAY OF FERRITIN: CPT

## 2020-03-12 PROCEDURE — 84484 ASSAY OF TROPONIN QUANT: CPT

## 2020-03-12 PROCEDURE — 85025 COMPLETE CBC W/AUTO DIFF WBC: CPT

## 2020-03-12 PROCEDURE — 85027 COMPLETE CBC AUTOMATED: CPT

## 2020-03-12 PROCEDURE — 83880 ASSAY OF NATRIURETIC PEPTIDE: CPT

## 2020-03-12 RX ADMIN — METOPROLOL TARTRATE SCH: 25 TABLET, FILM COATED ORAL at 19:48

## 2020-03-12 RX ADMIN — CEFAZOLIN SCH MLS/HR: 330 INJECTION, POWDER, FOR SOLUTION INTRAMUSCULAR; INTRAVENOUS at 14:15

## 2020-03-12 RX ADMIN — CEFAZOLIN SCH: 330 INJECTION, POWDER, FOR SOLUTION INTRAMUSCULAR; INTRAVENOUS at 21:20

## 2020-03-12 RX ADMIN — SODIUM FERRIC GLUCONATE COMPLEX SCH MLS/HR: 12.5 INJECTION INTRAVENOUS at 19:48

## 2020-03-12 NOTE — XR
EXAMINATION TYPE: XR chest 2V

 

DATE OF EXAM: 3/12/2020

 

COMPARISON: 12/1/2019

 

HISTORY: Difficulty breathing

 

TECHNIQUE:  Frontal and lateral views of the chest are obtained.

 

FINDINGS:  There is no focal air space opacity, pleural effusion, or pneumothorax seen.  The cardiac 
silhouette size is within normal limits.   The osseous structures are intact. Surgical clips in the l
eft breast.

 

IMPRESSION:  No acute cardiopulmonary process.

## 2020-03-12 NOTE — P.HPIM
History of Present Illness


Patient is a 64-year-old female came in after she was sent in by the 

cardiologist instructed to go to emergency department because of exertional 

dyspnea.  All the workup is negative except for hemoglobin  being 7.4.  A she 

does have microcytic hypochromic anemia patient was not never diagnosed with the

thalassemia but does have family history of thalassemia A.  Patient is 

requesting further testing because of her request I'll obtain ferritin levels 

and hemoglobin electrophoresis .  Patient had hemoglobin of 12 in December it ha

s come down to 7.4 patient denied any obvious GI bleed denied any dark stools or

blood in the stools or increased frequency of stools.  There is no afferent 

acute GI bleed patient may have some slow subacute GI bleed patient was started 

on aspirin and Plavix after her stent recently in December.  Patient is not in 

heart failure chest x-ray is within normal limits.  There is no obvious heart 

failure R any primary pulmonary process evident that can contributed to her 

exertional shortness of breath because of which she anemia is believed to have 

in contributing to her symptoms.  Although her hemoglobin is 7.4 because of 

which I'm not transfusing patient is presently receiving 75 mL of normal saline 

if her hemoglobin drops tomorrow, we'll transfuse PRBC.  All cord and transfuse 

IV iron supplementation because I am expecting severe iron deficiency anemia.





Review of Systems








REVIEW OF SYSTEMS: 


CONSTITUTIONAL: No fever, no malaise, no fatigue. 


HEENT: No recent visual problems or hearing problems. Denied any sore throat. 


CARDIOVASCULAR: No chest pain, orthopnea, PND, no palpitations, no syncope. 


PULMONARY:  no cough, no hemoptysis. 


GASTROINTESTINAL: No diarrhea, no nausea, no vomiting, no abdominal pain. 


NEUROLOGICAL: No headaches, no weakness, no numbness. 


HEMATOLOGICAL: Denies any bleeding or petechiae. 


GENITOURINARY: Denies any burning micturition, frequency, or urgency. 


MUSCULOSKELETAL/RHEUMATOLOGICAL: Denies any joint pain, swelling, or any muscle 

pain. 


ENDOCRINE: Denies any polyuria or polydipsia. 





The rest of the 14-point review of systems is negative.











Past Medical History


Past Medical History: Coronary Artery Disease (CAD), Cancer, Hypertension, 

Myocardial Infarction (MI), Osteoarthritis (OA), Pneumonia


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 

cancer with advanced radiation per pt-21 treatments, iron anemia with past 

transfusions, bronchitis, arthritis bilateral hands/knees


Last Myocardial Infarction Date:: 2019


History of Any Multi-Drug Resistant Organisms: None Reported


Past Surgical History: Breast Surgery, Heart Catheterization With Stent, Ton

sillectomy, Tubal Ligation


Additional Past Surgical History / Comment(s): 2016 L breast needle loc breast 

biopsy, 2016 L breast sentinel node dissection, D&C


Past Anesthesia/Blood Transfusion Reactions: No Reported Reaction


Additional Past Anesthesia/Blood Transfusion Reaction / Comment(s): HAS NEVER 

HAD GENERAL ANESTHESIA


Date of Last Stent Placement:: 2019


Smoking Status: Former smoker





- Past Family History


  ** Father


Additional Family Medical History / Comment(s): HEMPHILIA A.





  ** Daughter(s)


Additional Family Medical History / Comment(s): HEMOPHILIA A, 2 grandson's thru 

this edward have Hemophilia A





  ** Mother


Family Medical History: Cancer, Coronary Artery Disease (CAD)


Additional Family Medical History / Comment(s): KIDNEY CANCER





Medications and Allergies


                                Home Medications











 Medication  Instructions  Recorded  Confirmed  Type


 


Acetaminophen/Diphenhydramine 2 tab PO HS PRN 12/01/19 03/12/20 History





[Tylenol -25mg]    


 


Calcium Carbonate [Calcium] 1,200 mg PO DAILY 12/01/19 03/12/20 History


 


Cholecalciferol [Vitamin D3 (25 1,000 unit PO DAILY 12/01/19 03/12/20 History





Mcg = 1000 Iu)]    


 


Aspirin 81 mg PO DAILY  tab 12/02/19 03/12/20 Rx


 


Atorvastatin [Lipitor] 40 mg PO DAILY #90 tab 12/02/19 03/12/20 Rx


 


Clopidogrel [Plavix] 75 mg PO DAILY #90 tab 12/02/19 03/12/20 Rx


 


Metoprolol Tartrate [Lopressor] 25 mg PO BID #180 tab 12/02/19 03/12/20 Rx


 


Nitroglycerin Sl Tabs [Nitrostat] 0.4 mg SUBLINGUAL Q5M PRN #25 tab 12/02/19 03/12/20 Rx








                                    Allergies











Allergy/AdvReac Type Severity Reaction Status Date / Time


 


Latex, Natural Rubber Allergy  Rash/Hives Verified 03/12/20 13:08


 


Sulfa (Sulfonamide Allergy  Rash/Hives. Verified 03/12/20 13:08





Antibiotics)   ITCHING  


 


tramadol AdvReac  Nausea & Verified 03/12/20 13:08





   Vomiting  


 


pressure bandage Allergy  Itching/soraya Uncoded 03/12/20 10:58





   h  














Physical Exam


Vitals: 


                                   Vital Signs











  Temp Pulse Pulse Resp BP BP Pulse Ox


 


 03/12/20 15:37     17   


 


 03/12/20 14:40  98.0 F   77  16   107/60 


 


 03/12/20 14:17   78   17  101/67   99


 


 03/12/20 13:09   75   16  115/56   99


 


 03/12/20 10:55  97.8 F  72   18  104/64   100








                                Intake and Output











 03/12/20 03/12/20 03/12/20





 06:59 14:59 22:59


 


Other:   


 


  Voiding Method   Toilet


 


  Weight  66.678 kg 

















PHYSICAL EXAMINATION: 





GENERAL: The patient is alert and oriented x3, not in any acute distress. Well 

developed, well nourished.  Looks generally pale


HEENT: Pupils are round and equally reacting to light. EOMI.no scleral icterus. 

 Does have conjunctival pallor. Normocephalic, atraumatic. No pharyngeal 

erythema. No thyromegaly. 


CARDIOVASCULAR: S1 and S2 present. No murmurs, rubs, or gallops. 


PULMONARY: Chest is clear to auscultation, no wheezing or crackles. 


ABDOMEN: Soft, nontender, nondistended, normoactive bowel sounds. No palpable 

organomegaly. 


MUSCULOSKELETAL: No joint swelling or deformity.


EXTREMITIES: No cyanosis, clubbing, or pedal edema. 


NEUROLOGICAL: Gross neurological examination did not reveal any focal deficits. 


SKIN: No rashes. 

















Results


CBC & Chem 7: 


                                 03/12/20 11:29





                                 03/12/20 11:29


Labs: 


                  Abnormal Lab Results - Last 24 Hours (Table)











  03/12/20 03/12/20 Range/Units





  11:29 11:29 


 


RBC  3.70 L   (3.80-5.40)  m/uL


 


Hgb  7.4 L   (11.4-16.0)  gm/dL


 


Hct  26.9 L   (34.0-46.0)  %


 


MCV  72.8 L   (80.0-100.0)  fL


 


MCH  20.0 L   (25.0-35.0)  pg


 


MCHC  27.5 L   (31.0-37.0)  g/dL


 


RDW  17.8 H   (11.5-15.5)  %


 


Chloride   109 H  ()  mmol/L














Thrombosis Risk Factor Assmnt





- Choose All That Apply


Any of the Below Risk Factors Present?: Yes


Other Risk Factors: Yes


Each Risk Factor Represents 2 Points: Age 61-74 years, Malignancy


Other congenital or acquired thrombophilia - If yes, enter type in comment: No


Thrombosis Risk Factor Assessment Total Risk Factor Score: 4


Thrombosis Risk Factor Assessment Level: Moderate Risk





Assessment and Plan


Plan: 


- shortness of breath: Etiology is not completely clear patient has exertional 

shortness of breath without any evidence of heart failure or any cardiac 

pulmonary anemia may be contributing to his some of her shortness of breath with

 the workup for anemia and gastric body was consulted for possible subacute GI 

bleed patient doesn't have any acute blood loss anemia at this time patient 

doesn't have any clinical symptoms of acute GI bleed


-Coronary artery disease patient had a cardiac catheterization and stent eating 

and patient is on both aspirin and Plavix as there is no evidence of acute GI 

bleed are good and continue these medications at this time except and-

hypertension


-Osteoarthritis


-Hemophilia a cat.  Will obtain hemoglobin electrophoresis along with iron panel

 as mentioned

## 2020-03-12 NOTE — ED
General Adult HPI





- General


Chief complaint: Shortness of Breath


Stated complaint: KAVITHA


Time Seen by Provider: 03/12/20 11:05


Source: patient


Mode of arrival: ambulatory


Limitations: no limitations





- History of Present Illness


Initial comments: 


Dictation was produced using dragon dictation software. please excuse any 

grammatical, word or spelling errors. 





Chief Complaint: 64-year-old female past medical history of coronary artery 

disease presents with shortness of breath.





History of Present Illness: Patient 64-year-old female she states she's been 

short of breath for approximately 2 weeks.  She was advised by her cardiologist 

and was instructed to come to the emergency department for concerns of dyspnea. 

Patient states she's been short of breath it's been worse with exertion.  She 

denies any chest pain.  Denies any strokelike symptoms.  No noticing 

paresthesias to the extremities.  States that she can barely make her bed bec

ause she becomes so short of breath.  Patient has quit smoking for approximately

3 days.  Patient denies any nausea vomiting.  No dark stools.  Patient does take

a beta blocker.  Her stents were most recently laced in November.








The ROS documented in this emergency department record has been reviewed and 

confirmed by me.  Those systems with pertinent positive or negative responses 

have been documented in the HPI.  All other systems are other negative and/or 

noncontributory.








PHYSICAL EXAM:


General Impression: Alert and oriented x3, not in acute distress


HEENT: Normocephalic atraumatic, extra-ocular movements intact, pupils equal and

reactive to light bilaterally, mucous membranes moist.


Cardiovascular: Heart regular rate and rhythm, S1&S2 audible, no murmurs, rubs 

or gallops


Chest: Lungs clear to auscultation bilaterally, no rhonchi, no wheeze, no rales


Abdomen: Bowel sounds present, abdomen soft, non-tender, non-distended, no 

organomegaly


Musculoskeletal: Pulses present and equal in all extremities, no peripheral 

edema


Motor:  no focal deficits noted


Neurological: CN II-XII grossly intact, no focal motor or sensory deficits noted


Skin: Intact with no visualized rashes


Psych: Normal affect and mood





ED course: 64-year-old female presents with chief complaint of exertional dyspn

ea.  Vital signs upon arrival are within acceptable limits.


Laboratory evaluation obtained.  Hemoglobin depressed at 7.4.  This appears to 

be significantly lower compared to December from 2019.  Patient per she has a 

history of iron deficiency anemia.  D-dimer is 0.26.  Metabolic panel is 

negative, troponin is negative.  Chest x-ray shows no acute processes.  

Presentation consistent with anemia.  Given the patient is so close to the 

threshold for blood transfusion we'll have patient admitted to the hospital.  

There is  concern of GI bleed.  Patient provided with Protonix.  There is no 

source of obvious bleeding at this time.  Discussed patient case with Dr. Aburto who is willing to accept patient's care.  Clinical presentation 

consistent with symptomatic anemia.





EKG interpretation: Ventricular rate 71, normal sinus rhythm,.  Interval 116, 

QRS 70, QTc 439. No MN prolongation, no QTC prolongation, no ST or T-wave 

changes noted.   Overall, this EKG is unremarkable











- Related Data


                                Home Medications











 Medication  Instructions  Recorded  Confirmed


 


Acetaminophen/Diphenhydramine 2 tab PO HS PRN 12/01/19 12/01/19





[Tylenol -25mg]   


 


Calcium Carbonate [Calcium] 1,200 mg PO DAILY 12/01/19 12/01/19


 


Cholecalciferol [Vitamin D3 (25 1,000 unit PO DAILY 12/01/19 12/01/19





Mcg = 1000 Iu)]   








                                  Previous Rx's











 Medication  Instructions  Recorded


 


Aspirin 81 mg PO DAILY  tab 12/02/19


 


Atorvastatin [Lipitor] 40 mg PO DAILY #90 tab 12/02/19


 


Clopidogrel [Plavix] 75 mg PO DAILY #90 tab 12/02/19


 


Metoprolol Tartrate [Lopressor] 25 mg PO BID #180 tab 12/02/19


 


Nitroglycerin Sl Tabs [Nitrostat] 0.4 mg SUBLINGUAL Q5M PRN #25 tab 12/02/19











                                    Allergies











Allergy/AdvReac Type Severity Reaction Status Date / Time


 


Latex, Natural Rubber Allergy  Rash/Hives Verified 03/12/20 10:58


 


Sulfa (Sulfonamide Allergy  Rash/Hives. Verified 03/12/20 10:58





Antibiotics)   ITCHING  


 


tramadol AdvReac  Nausea & Verified 03/12/20 10:58





   Vomiting  


 


pressure bandage Allergy  Itching/soraya Uncoded 03/12/20 10:58





   h  














Review of Systems


ROS Statement: 


Those systems with pertinent positive or pertinent negative responses have been 

documented in the HPI.





ROS Other: All systems not noted in ROS Statement are negative.





Past Medical History


Past Medical History: Coronary Artery Disease (CAD), Cancer


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 No LEFT 

ARM


History of Any Multi-Drug Resistant Organisms: None Reported


Past Surgical History: Breast Surgery, Heart Catheterization With Stent, 

Tonsillectomy, Tubal Ligation


Additional Past Surgical History / Comment(s): D & C. LEFT STEREOTACTIC  BREAST 

BX  (3/18/16)


Past Anesthesia/Blood Transfusion Reactions: No Reported Reaction


Additional Past Anesthesia/Blood Transfusion Reaction / Comment(s): HAS NEVER 

HAD GENERAL ANESTHESIA


Date of Last Stent Placement:: 2009


Past Psychological History: No Psychological Hx Reported


Smoking Status: Former smoker


Past Alcohol Use History: Rare


Past Drug Use History: None Reported





- Past Family History


  ** Father


Additional Family Medical History / Comment(s): HEMPHILIA A.





  ** Daughter(s)


Additional Family Medical History / Comment(s): HEMOPHILIA A





  ** Mother


Family Medical History: Cancer, Coronary Artery Disease (CAD)


Additional Family Medical History / Comment(s): KIDNEY CANCER





General Exam


Limitations: no limitations





Course


                                   Vital Signs











  03/12/20





  10:55


 


Temperature 97.8 F


 


Pulse Rate 72


 


Respiratory 18





Rate 


 


Blood Pressure 104/64


 


O2 Sat by Pulse 100





Oximetry 














Medical Decision Making





- Lab Data


Result diagrams: 


                                 03/12/20 11:29





                                 03/12/20 11:29


                                   Lab Results











  03/12/20 03/12/20 03/12/20 Range/Units





  11:29 11:29 11:29 


 


WBC  9.1    (3.8-10.6)  k/uL


 


RBC  3.70 L    (3.80-5.40)  m/uL


 


Hgb  7.4 L    (11.4-16.0)  gm/dL


 


Hct  26.9 L    (34.0-46.0)  %


 


MCV  72.8 L    (80.0-100.0)  fL


 


MCH  20.0 L    (25.0-35.0)  pg


 


MCHC  27.5 L    (31.0-37.0)  g/dL


 


RDW  17.8 H    (11.5-15.5)  %


 


Plt Count  231    (150-450)  k/uL


 


Neutrophils %  75    %


 


Lymphocytes %  12    %


 


Monocytes %  6    %


 


Eosinophils %  4    %


 


Basophils %  0    %


 


Neutrophils #  6.9    (1.3-7.7)  k/uL


 


Lymphocytes #  1.1    (1.0-4.8)  k/uL


 


Monocytes #  0.6    (0-1.0)  k/uL


 


Eosinophils #  0.4    (0-0.7)  k/uL


 


Basophils #  0.0    (0-0.2)  k/uL


 


Hypochromasia  Marked    


 


Poikilocytosis  Moderate    


 


Anisocytosis  Slight    


 


Microcytosis  Moderate    


 


D-Dimer     (<0.60)  mg/L FEU


 


Sodium   139   (137-145)  mmol/L


 


Potassium   4.6   (3.5-5.1)  mmol/L


 


Chloride   109 H   ()  mmol/L


 


Carbon Dioxide   23   (22-30)  mmol/L


 


Anion Gap   7   mmol/L


 


BUN   15   (7-17)  mg/dL


 


Creatinine   0.63   (0.52-1.04)  mg/dL


 


Est GFR (CKD-EPI)AfAm   >90   (>60 ml/min/1.73 sqM)  


 


Est GFR (CKD-EPI)NonAf   >90   (>60 ml/min/1.73 sqM)  


 


Glucose   85   (74-99)  mg/dL


 


Calcium   8.8   (8.4-10.2)  mg/dL


 


Magnesium   2.0   (1.6-2.3)  mg/dL


 


Troponin I     (0.000-0.034)  ng/mL


 


NT-Pro-B Natriuret Pep    189  pg/mL














  03/12/20 03/12/20 Range/Units





  11:29 11:29 


 


WBC    (3.8-10.6)  k/uL


 


RBC    (3.80-5.40)  m/uL


 


Hgb    (11.4-16.0)  gm/dL


 


Hct    (34.0-46.0)  %


 


MCV    (80.0-100.0)  fL


 


MCH    (25.0-35.0)  pg


 


MCHC    (31.0-37.0)  g/dL


 


RDW    (11.5-15.5)  %


 


Plt Count    (150-450)  k/uL


 


Neutrophils %    %


 


Lymphocytes %    %


 


Monocytes %    %


 


Eosinophils %    %


 


Basophils %    %


 


Neutrophils #    (1.3-7.7)  k/uL


 


Lymphocytes #    (1.0-4.8)  k/uL


 


Monocytes #    (0-1.0)  k/uL


 


Eosinophils #    (0-0.7)  k/uL


 


Basophils #    (0-0.2)  k/uL


 


Hypochromasia    


 


Poikilocytosis    


 


Anisocytosis    


 


Microcytosis    


 


D-Dimer   0.26  (<0.60)  mg/L FEU


 


Sodium    (137-145)  mmol/L


 


Potassium    (3.5-5.1)  mmol/L


 


Chloride    ()  mmol/L


 


Carbon Dioxide    (22-30)  mmol/L


 


Anion Gap    mmol/L


 


BUN    (7-17)  mg/dL


 


Creatinine    (0.52-1.04)  mg/dL


 


Est GFR (CKD-EPI)AfAm    (>60 ml/min/1.73 sqM)  


 


Est GFR (CKD-EPI)NonAf    (>60 ml/min/1.73 sqM)  


 


Glucose    (74-99)  mg/dL


 


Calcium    (8.4-10.2)  mg/dL


 


Magnesium    (1.6-2.3)  mg/dL


 


Troponin I  <0.012   (0.000-0.034)  ng/mL


 


NT-Pro-B Natriuret Pep    pg/mL














Disposition


Clinical Impression: 


 Anemia





Disposition: ADMITTED AS IP TO THIS HOSP


Condition: Fair


Referrals: 


None,Stated [Primary Care Provider] - 1-2 days


Decision Time: 13:02

## 2020-03-13 LAB
ERYTHROCYTE [DISTWIDTH] IN BLOOD BY AUTOMATED COUNT: 3.07 M/UL (ref 3.8–5.4)
ERYTHROCYTE [DISTWIDTH] IN BLOOD BY AUTOMATED COUNT: 3.57 M/UL (ref 3.8–5.4)
ERYTHROCYTE [DISTWIDTH] IN BLOOD: 17.5 % (ref 11.5–15.5)
ERYTHROCYTE [DISTWIDTH] IN BLOOD: 18.1 % (ref 11.5–15.5)
HCT VFR BLD AUTO: 22.7 % (ref 34–46)
HCT VFR BLD AUTO: 26.3 % (ref 34–46)
HGB BLD-MCNC: 6.1 GM/DL (ref 11.4–16)
HGB BLD-MCNC: 7.4 GM/DL (ref 11.4–16)
MCH RBC QN AUTO: 19.9 PG (ref 25–35)
MCH RBC QN AUTO: 20.6 PG (ref 25–35)
MCHC RBC AUTO-ENTMCNC: 26.9 G/DL (ref 31–37)
MCHC RBC AUTO-ENTMCNC: 28 G/DL (ref 31–37)
MCV RBC AUTO: 73.8 FL (ref 80–100)
MCV RBC AUTO: 73.8 FL (ref 80–100)
PLATELET # BLD AUTO: 176 K/UL (ref 150–450)
PLATELET # BLD AUTO: 213 K/UL (ref 150–450)
WBC # BLD AUTO: 5.4 K/UL (ref 3.8–10.6)
WBC # BLD AUTO: 8.2 K/UL (ref 3.8–10.6)

## 2020-03-13 RX ADMIN — Medication SCH UNIT: at 09:26

## 2020-03-13 RX ADMIN — ASPIRIN 81 MG CHEWABLE TABLET SCH: 81 TABLET CHEWABLE at 09:25

## 2020-03-13 RX ADMIN — CEFAZOLIN SCH: 330 INJECTION, POWDER, FOR SOLUTION INTRAMUSCULAR; INTRAVENOUS at 12:44

## 2020-03-13 RX ADMIN — CLOPIDOGREL BISULFATE SCH: 75 TABLET ORAL at 09:25

## 2020-03-13 RX ADMIN — METOPROLOL TARTRATE SCH: 25 TABLET, FILM COATED ORAL at 09:25

## 2020-03-13 RX ADMIN — PANTOPRAZOLE SODIUM SCH MG: 40 INJECTION, POWDER, FOR SOLUTION INTRAVENOUS at 09:26

## 2020-03-13 RX ADMIN — SODIUM FERRIC GLUCONATE COMPLEX SCH MLS/HR: 12.5 INJECTION INTRAVENOUS at 10:10

## 2020-03-13 RX ADMIN — ATORVASTATIN CALCIUM SCH MG: 40 TABLET, FILM COATED ORAL at 09:26

## 2020-03-13 RX ADMIN — CEFAZOLIN SCH MLS/HR: 330 INJECTION, POWDER, FOR SOLUTION INTRAMUSCULAR; INTRAVENOUS at 23:06

## 2020-03-13 RX ADMIN — CALCIUM CARBONATE (ANTACID) CHEW TAB 500 MG SCH MG: 500 CHEW TAB at 09:26

## 2020-03-13 RX ADMIN — METOPROLOL TARTRATE SCH MG: 25 TABLET, FILM COATED ORAL at 23:06

## 2020-03-13 NOTE — P.PN
Subjective


Progress Note Date: 03/13/20


Principal diagnosis: 





Patient is a 64-year-old female came in after she was sent in by the 

cardiologist instructed to go to emergency department because of exertional 

dyspnea.  All the workup is negative except for hemoglobin  being 7.4.  A she 

does have microcytic hypochromic anemia patient was not never diagnosed with the

thalassemia but does have family history of thalassemia A.  Patient is 

requesting further testing because of her request I'll obtain ferritin levels 

and hemoglobin electrophoresis .  Patient had hemoglobin of 12 in December it 

has come down to 7.4 patient denied any obvious GI bleed denied any dark stools 

or blood in the stools or increased frequency of stools.  There is no afferent 

acute GI bleed patient may have some slow subacute GI bleed patient was started 

on aspirin and Plavix after her stent recently in December.  Patient is not in 

heart failure chest x-ray is within normal limits.  There is no obvious heart 

failure R any primary pulmonary process evident that can contributed to her 

exertional shortness of breath because of which she anemia is believed to have 

in contributing to her symptoms.  Although her hemoglobin is 7.4 because of w

hich I'm not transfusing patient is presently receiving 75 mL of normal saline 

if her hemoglobin drops tomorrow, we'll transfuse PRBC.  All cord and transfuse 

IV iron supplementation because I am expecting severe iron deficiency anemia.





03/13/2020


Patient's hemoglobin today is 6.1 and currently awaiting to receive a unit of 

blood.  Ferritin levels low at 3.8 and will continue with ferric sodium g

luconate transfusions.  Patient is on aspirin and Plavix and will be held as 

patient will be prepping for endoscopy this evening.  Patient continues to have 

some shortness of breath on exertion with no reports of chest pain or 

palpitations.  Patient is afebrile.  No reports of nausea or vomiting and 

patient is maintained on a clear liquid diet.  To be nothing by mouth after 

midnight.  Hemoglobin electrophoresis drawn and is pending at this time.








Objective





- Vital Signs


Vital signs: 


                                   Vital Signs











Temp  97.4 F L  03/13/20 15:09


 


Pulse  72   03/13/20 15:09


 


Resp  14   03/13/20 15:09


 


BP  100/54   03/13/20 15:09


 


Pulse Ox  100   03/13/20 14:06








                                 Intake & Output











 03/12/20 03/13/20 03/13/20





 18:59 06:59 18:59


 


Intake Total  500 100


 


Balance  500 100


 


Weight 66.678 kg  66.678 kg


 


Intake:   


 


  Intake, IV Titration   100





  Amount   


 


    Sodium Ferric Gluconat-   100





    Sucrose 125 mg In Sodium   





    Chloride 0.9% 100 ml @   





    100 mls/hr IVPB DAILY [facility]   





    Rx#:[id number]   


 


  Oral  500 


 


  Blood Product   0


 


    Rc Cpda-1  Unit   0





    [id number]   


 


Other:   


 


  Voiding Method Toilet Toilet 


 


  # Voids  1 














- Exam





GENERAL: The patient is alert and oriented x3, not in any acute distress. Well 

developed, well nourished.  Looks generally pale


HEENT: Pupils are round and equally reacting to light. EOMI.no scleral icterus. 

Does have conjunctival pallor. Normocephalic, atraumatic. No pharyngeal 

erythema. No thyromegaly. 


CARDIOVASCULAR: S1 and S2 present. No murmurs, rubs, or gallops. 


PULMONARY: Chest is clear to auscultation, no wheezing or crackles. 


ABDOMEN: Soft, nontender, nondistended, normoactive bowel sounds. No palpable 

organomegaly. 


MUSCULOSKELETAL: No joint swelling or deformity.


EXTREMITIES: No cyanosis, clubbing, or pedal edema. 


NEUROLOGICAL: Gross neurological examination did not reveal any focal deficits. 


SKIN: No rashes. 








- Labs


CBC & Chem 7: 


                                 03/13/20 10:01





                                 03/12/20 11:29


Labs: 


                  Abnormal Lab Results - Last 24 Hours (Table)











  03/12/20 03/13/20 03/13/20 Range/Units





  11:29 10:01 11:39 


 


RBC   3.07 L   (3.80-5.40)  m/uL


 


Hgb   6.1 L*   (11.4-16.0)  gm/dL


 


Hct   22.7 L   (34.0-46.0)  %


 


MCV   73.8 L   (80.0-100.0)  fL


 


MCH   19.9 L   (25.0-35.0)  pg


 


MCHC   26.9 L   (31.0-37.0)  g/dL


 


RDW   17.5 H   (11.5-15.5)  %


 


Ferritin  3.8 L    (10.0-291.0)  ng/mL


 


Crossmatch    See Detail  














Assessment and Plan


Assessment: 





-shortness of breath: Exertional.  Likely contributing to anemia. gastroenter

ology was consulted for possible subacute GI bleed patient doesn't have any 

acute blood loss anemia at this time patient doesn't have any clinical symptoms 

of acute GI bleed.  Stool occult Was negative. Repeat hemoglobin today is 6.1 

and awaiting to receive a unit of blood.  Will repeat a.m. labs.  GI is 

following and plans for endoscopy tomorrow.  Patient will be prepped tonight.


-Coronary artery disease patient had a cardiac catheterization and stent. 

patient is on both aspirin and Plavix as there is no evidence of acute GI bleed 

are good and continue these medications at this time 


-hypertension


-Osteoarthritis


-Hemophilia. Will obtain hemoglobin electrophoresis along with iron panel as m

entioned

## 2020-03-13 NOTE — CONS
CONSULTATION



CHIEF COMPLAINT:

Shortness of breath.



This is a 64-year-old lady with history of coronary artery disease, status post

angioplasty in November of 2019, who is on dual antiplatelet therapy, presented to my

office yesterday complaining of shortness of breath.  She states that she has moderate

intensity shortness of breath for the last few weeks that has been getting

progressively worse now.  She is short of breath with very little activity.  EKG showed

sinus rhythm and was within normal limits. I sent the patient to the emergency room

where she was found to be anemic and had been admitted to the hospital.  At the time of

my evaluation this morning, she is free of symptoms.  She is receiving iron and the

hemoglobin this morning had actually come back at 6.1.  She has hypochromic microcytic

anemia.  Needs evaluation to rule out source for her blood loss.  The patient is on

dual antiplatelet therapy, but we are going to hold it at this time so that she can

proceed with her EGD and colonoscopy and she will probably need blood transfusion to

maintain a hemoglobin of around 8.



PAST MEDICAL HISTORY:

Significant for coronary artery disease status post angioplasty and dyslipidemia.



MEDICATIONS:

At home include aspirin, Plavix, Lipitor and Lopressor.



ALLERGIES:

Allergy to LATEX, SULFA, TRAMADOL.



FAMILY HISTORY:

Negative for premature coronary artery disease.



SOCIAL HISTORY:

Negative for current smoking, EtOH abuse, or drug abuse.



REVIEW OF SYSTEMS:

HEENT:  Unremarkable.

CARDIAC:  As described above.

RESPIRATORY:  As described above.

GI:  Negative.

GENITOURINARY: Negative.

ALLERGY/IMMUNOLOGY: None.

SKIN: Negative.

MUSCULOSKELETAL:  Significant for arthritis.

PSYCHOSOCIAL:  Negative.

DERMATOLOGY:  Negative.

CONSTITUTIONAL: Negative.

ONCOLOGICAL:  Negative

CNS:  Negative.



Rest of the system review is not relevant.



PHYSICAL EXAM:

Patient is comfortable at rest.  Vital signs are stable.  There is no jugular venous

distention.  Carotid upstroke is normal.  There is no bruit.  Chest exam reveals good

air entry bilaterally.  Heart exam reveals first and second heart sounds.  No gallop.

No murmur.  No rub.  Abdomen is soft, nontender.  Exam of the extremities did not

reveal any edema.  Peripheral pulses are felt.



ASSESSMENT:

1. Shortness of breath secondary to acute blood loss anemia.

2. Coronary artery disease, status post angioplasty.



PLAN:

Patient has known CAD and had prior stenting of the circumflex coronary artery, mild to

moderate disease involving LAD and the lesion within the PDA that was stented by Dr. Mejias with a drug-eluting stent on December 1, 2019.  Given the severe and symptomatic

anemia, patient needs an EGD and colonoscopy and we have to hold the antiplatelet

agents.  The patient understands the risk of subacute stent thrombosis and acute

myocardial infarction. [place], but the full.





MMODL / IJN: [id number] / Job#: [id number]

## 2020-03-13 NOTE — CONS
CONSULTATION



DATE OF DICTATION:

03/12/2020



REASON FOR CONSULTATION:

Microcytic hypochromic anemia.



HISTORY OF PRESENT ILLNESS:

The patient is a 64-year-old pleasant white female came into the emergency room

complaining of exertional dyspnea for the last 1 week duration.  She went to see Dr. MATT Adkins on an outpatient basis. She has history of coronary artery disease, status post

angioplasty with stent placement in November of 2019 and since then has been on aspirin

and Plavix. She denies any GI symptoms.  Reports no abdominal pain.  Denies any nausea,

vomiting, rectal bleeding or melena. She has some chronic constipation.  No prior

history of peptic ulcer disease or recent NSAID use. Never had EGD or colonoscopy in

the past.  She came to the emergency room and was noted to have a hemoglobin of 7.4

g/dL and decreased MCV consistent with microcytic hypochromic anemia.



PAST MEDICAL HISTORY:

Her past medical history is significant for coronary artery disease, status post

angioplasty with stent placement in November of 2019, history of hypertension,

degenerative joint disease.



PAST SURGICAL HISTORY:

Left breast cancer for which she underwent radiation therapy and breast surgery,

tonsillectomy, cardiac cath with stent placement, tubal ligation, D and C.



SOCIAL HISTORY:

Former smoker.  No alcohol use.



FAMILY HISTORY:

Father has hemophilia. Daughter has hemophilia.



MEDICATIONS:

Medications at the time of admission to the hospital: Nitrostat, Lopressor, Plavix

aspirin, Lipitor, vitamin D3, Tylenol PM, and calcium.



ALLERGIES:

Allergies to BACTRIM and LATEX.



REVIEW OF SYSTEMS:

CARDIOPULMONARY:  No chest pain or shortness of breath.

GENITOURINARY: No dysuria or hematuria.

MUSCULOSKELETAL: Unremarkable.

SKIN: Unremarkable.

ENDOCRINE: Unremarkable.

PSYCHIATRIC: Unremarkable.

NEUROLOGY: Unremarkable.

ENT/VISION: Unremarkable.

CONSTITUTIONAL:  No recent weight loss.  No fever, chills, night sweats.



PHYSICAL EXAMINATION:

She appears comfortable.  No apparent distress.

Vital signs are stable.  Blood pressure is 133/86, pulse rate 77 per minute and

afebrile.

HEENT: Examination unremarkable. Conjunctivae pink. Sclerae anicteric.  Oral cavity, no

lesions.

NECK: No JVD or lymph node enlargement.

CHEST: Clear to auscultation.

HEART:  Regular rate and rhythm.

ABDOMEN:  Soft.  Bowel sounds are positive.  No organomegaly.

EXTREMITIES: No pedal edema.

SKIN: No rashes.

NEUROLOGIC:  Alert and oriented x3.  No focal deficits.



LABS:

Labs done at the time of admission to the hospital: Hemoglobin is 7.7, WBC 9.1, MCV 72,

platelets 231.  Basic metabolic panel is within normal limits.  Stool occult blood was

negative.



IMPRESSION:

1. This is a patient who comes into the hospital with exertional shortness of breath

    for the last 2 weeks duration and noted to have microcytic anemia consistent with

    iron deficiency anemia.  Clinically no evidence of active ongoing bleeding.  Stool

    occult blood was negative.  No prior history of peptic ulcer disease or recent

    NSAID use.  No prior history of EGD or colonoscopy in the past.

2. Coronary artery disease, status post myocardial infarction in the past, status post

    angioplasty with stent placement in November of 2019, presently on aspirin and

    Plavix.



RECOMMENDATION:

1. Start her on clear liquid diet.

2. Monitor CBC on a daily basis.

3. Transfuse if hemoglobin is less than 7.

4. We will discuss with cardiologist regarding endoscopy intervention and need to stop

    antiplatelet therapy.

5. Further recommendations will be made tomorrow.

Thank you for this consultation.





MMODL / IJN: [id number] / Job#: [id number]

## 2020-03-13 NOTE — PN
PROGRESS NOTE



Patient is a 54-year-old pleasant white female admitted to the hospital with severe

symptomatic microcytic hypochromic anemia with a hemoglobin of 7.4.  Today it dropped

to 6.1.  She denies any symptoms.  She remains on aspirin and Plavix for recently

placed cardiac stents in November of 2019.  She reports no symptoms.  She received one

unit of blood transfusion.



PHYSICAL EXAMINATION:

Appears comfortable in no apparent distress.  Vital signs stable.  Blood pressure

110/56, pulse 72, temperature 98.4.

HEENT examination unremarkable. Conjunctivae pink. Sclerae anicteric.  Oral cavity no

lesions.

NECK: No JVD or lymph node enlargement.

CHEST was clear to auscultation.

HEART:  Regular rate and rhythm.

ABDOMEN:  Soft, bowel sounds are positive.  No organomegaly.  No pedal edema.

SKIN no rashes.

NEUROLOGIC:  Alert and oriented x3.  No focal deficits.



LABS:

From today WBC is 5.4, hemoglobin 6.1, platelets 176.  Stool occult blood was negative.

Ferritin is 3.8.



IMPRESSION:

1. Severe iron deficiency anemia secondary to occult GI blood loss.  Clinically no

    evidence of active bleeding. Hemoccult was negative.  Most likely dealing with GI

    blood loss.  Hemoglobin 6.1, requiring one unit of blood transfusion.

2. History of coronary artery disease, status post angioplasty with stent placement in

    November of 2018.  Presently on aspirin and Plavix which has been on hold today.



RECOMMENDATIONS:

I had a lengthy discussion with the patient as well as the family at the bedside.  At

this time, we will proceed with an EGD and colonoscopy to evaluate for occult

gastrointestinal blood loss.  She will remain on aspirin and Plavix during the

procedure and she understands risks, benefits and complications of the procedure.  She

also understands that if large polyps are noted, they will not be removed during the

colonoscopy and this could be addressed at a later date.  Repeat CBC in the morning.

We will follow with you closely.



Thank you for this consultation.





MMODL / IJN: [id number] / Job#: [id number]

## 2020-03-14 VITALS — HEART RATE: 92 BPM

## 2020-03-14 VITALS — TEMPERATURE: 98 F | DIASTOLIC BLOOD PRESSURE: 78 MMHG | SYSTOLIC BLOOD PRESSURE: 117 MMHG

## 2020-03-14 VITALS — RESPIRATION RATE: 16 BRPM

## 2020-03-14 PROCEDURE — 0DJ08ZZ INSPECTION OF UPPER INTESTINAL TRACT, VIA NATURAL OR ARTIFICIAL OPENING ENDOSCOPIC: ICD-10-PCS

## 2020-03-14 PROCEDURE — 0DJD8ZZ INSPECTION OF LOWER INTESTINAL TRACT, VIA NATURAL OR ARTIFICIAL OPENING ENDOSCOPIC: ICD-10-PCS

## 2020-03-14 RX ADMIN — PANTOPRAZOLE SODIUM SCH: 40 INJECTION, POWDER, FOR SOLUTION INTRAVENOUS at 09:42

## 2020-03-14 RX ADMIN — SODIUM FERRIC GLUCONATE COMPLEX SCH MLS/HR: 12.5 INJECTION INTRAVENOUS at 11:50

## 2020-03-14 RX ADMIN — ATORVASTATIN CALCIUM SCH MG: 40 TABLET, FILM COATED ORAL at 09:42

## 2020-03-14 RX ADMIN — Medication SCH UNIT: at 09:42

## 2020-03-14 RX ADMIN — METOPROLOL TARTRATE SCH MG: 25 TABLET, FILM COATED ORAL at 09:42

## 2020-03-14 RX ADMIN — ASPIRIN 81 MG CHEWABLE TABLET SCH MG: 81 TABLET CHEWABLE at 09:42

## 2020-03-14 RX ADMIN — CALCIUM CARBONATE (ANTACID) CHEW TAB 500 MG SCH MG: 500 CHEW TAB at 09:42

## 2020-03-14 RX ADMIN — CLOPIDOGREL BISULFATE SCH MG: 75 TABLET ORAL at 09:42

## 2020-03-14 NOTE — PN
PROGRESS NOTE



Anamika was admitted to hospital with shortness of breath secondary to anemia.  She has

had recent angioplasty, underwent an EGD and colonoscopy this morning, and apparently

they did not find a significant source for her blood loss.  She is being started on

Protonix and will continue the aspirin and Plavix and will follow up with me in the

office over the next several months.





MMTOMEKAL / JEFFERYN: [id number] / Job#: [id number]

## 2020-03-14 NOTE — P.DS
Providers


Date of admission: 


03/12/20 13:02





Attending physician: 


Miki Aburto





Consults: 





                                        





03/12/20 12:48


Consult Physician Routine 


   Consulting Provider: Mark Bridges


   Consult Reason/Comments: Gi bleed


   Do you want consulting provider notified?: Yes





03/12/20 15:23


Consult Physician Routine 


   Consulting Provider: Cem Adkins


   Consult Reason/Comments: recent stent, need recommendations on stopping 

Plavix for endoscopy


   Do you want consulting provider notified?: Yes











Primary care physician: 


Stated None





Hospital Course: 





64-year-old female came in after she was sent in by the cardiologist instructed 

to go to emergency department because of exertional dyspnea.  All the workup is 

negative except for hemoglobin  being 7.4.  A she does have microcytic 

hypochromic anemia patient was not never diagnosed with the thalassemia but does

have family history of thalassemia A.  Patient is requesting further testing 

because of her request I'll obtain ferritin levels and hemoglobin 

electrophoresis .  Patient had hemoglobin of 12 in December it has come down to 

7.4 patient denied any obvious GI bleed denied any dark stools or blood in the 

stools or increased frequency of stools.  There is no afferent acute GI bleed 

patient may have some slow subacute GI bleed patient was started on aspirin and 

Plavix after her stent recently in December.  Patient is not in heart failure 

chest x-ray is within normal limits.  There is no obvious heart failure R any 

primary pulmonary process evident that can contributed to her exertional 

shortness of breath because of which she anemia is believed to have in cont

ributing to her symptoms.  Although her hemoglobin is 7.4 because of which I'm 

not transfusing patient is presently receiving 75 mL of normal saline if her 

hemoglobin drops tomorrow, we'll transfuse PRBC.  All cord and transfuse IV iron

supplementation because I am expecting severe iron deficiency anemia.





03/13/2020


Patient's hemoglobin today is 6.1 and currently awaiting to receive a unit of 

blood.  Ferritin levels low at 3.8 and will continue with ferric sodium 

gluconate transfusions.  Patient is on aspirin and Plavix and will be held as 

patient will be prepping for endoscopy this evening.  Patient continues to have 

some shortness of breath on exertion with no reports of chest pain or 

palpitations.  Patient is afebrile.  No reports of nausea or vomiting and 

patient is maintained on a clear liquid diet.  To be nothing by mouth after 

midnight.  Hemoglobin electrophoresis drawn and is pending at this time.





03/14/2020


patient underwent  upper GI endoscopy showed erosive gastritis patient will 

illness stable at 7.4.  patient will be discharged on Prilosec and iron 

supplementation











PHYSICAL EXAMINATION: 





GENERAL: The patient is alert and oriented x3, not in any acute distress. Well 

developed, well nourished. 


HEENT: Pupils are round and equally reacting to light. EOMI. No scleral icterus.

No conjunctival pallor. Normocephalic, atraumatic. No pharyngeal erythema. No 

thyromegaly. 


CARDIOVASCULAR: S1 and S2 present. No murmurs, rubs, or gallops. 


PULMONARY: Chest is clear to auscultation, no wheezing or crackles. 


ABDOMEN: Soft, nontender, nondistended, normoactive bowel sounds. No palpable 

organomegaly. 


MUSCULOSKELETAL: No joint swelling or deformity.


EXTREMITIES: No cyanosis, clubbing, or pedal edema. 


NEUROLOGICAL: Gross neurological examination did not reveal any focal deficits. 


SKIN: No rashes. 








Assessment and Plan


Assessment: 





symptomatic anemia


-Esophagitis


-Severe iron deficiency anemia without any acute blood loss


-Coronary artery disease patient had a cardiac catheterization and stent. 

patient is on both aspirin and Plavix as there is no evidence of acute GI bleed 


-hypertension


-Osteoarthritis





Patient Condition at Discharge: Fair





Plan - Discharge Summary


Discharge Rx Participant: No


New Discharge Prescriptions: 


New


   Aspirin 81 mg PO DAILY #30 chew


   Ferrous Sulfate [Feosol] 325 mg PO BID #60 tab


   Omeprazole [PriLOSEC] 40 mg PO -BRKFST #30 capsule.





Continue


   Cholecalciferol [Vitamin D3 (25 Mcg = 1000 Iu)] 1,000 unit PO DAILY


   Calcium Carbonate [Calcium] 1,200 mg PO DAILY


   Acetaminophen/Diphenhydramine [Tylenol -25mg] 2 tab PO HS PRN


     PRN Reason: sleep/pain


   Atorvastatin [Lipitor] 40 mg PO DAILY #90 tab


   Metoprolol Tartrate [Lopressor] 25 mg PO BID #180 tab


   Nitroglycerin Sl Tabs [Nitrostat] 0.4 mg SUBLINGUAL Q5M PRN #25 tab


     PRN Reason: Chest Pain


   Clopidogrel [Plavix] 75 mg PO DAILY #90 tab





Discontinued


   Aspirin 81 mg PO DAILY  tab


Discharge Medication List





Acetaminophen/Diphenhydramine [Tylenol -25mg] 2 tab PO HS PRN 12/01/19 

[History]


Calcium Carbonate [Calcium] 1,200 mg PO DAILY 12/01/19 [History]


Cholecalciferol [Vitamin D3 (25 Mcg = 1000 Iu)] 1,000 unit PO DAILY 12/01/19 

[History]


Atorvastatin [Lipitor] 40 mg PO DAILY #90 tab 12/02/19 [Rx]


Clopidogrel [Plavix] 75 mg PO DAILY #90 tab 12/02/19 [Rx]


Metoprolol Tartrate [Lopressor] 25 mg PO BID #180 tab 12/02/19 [Rx]


Nitroglycerin Sl Tabs [Nitrostat] 0.4 mg SUBLINGUAL Q5M PRN #25 tab 12/02/19 

[Rx]


Aspirin 81 mg PO DAILY #30 chew 03/14/20 [Rx]


Ferrous Sulfate [Feosol] 325 mg PO BID #60 tab 03/14/20 [Rx]


Omeprazole [PriLOSEC] 40 mg PO AC-BRKFST #30 capsule. 03/14/20 [Rx]








Follow up Appointment(s)/Referral(s): 


Josefina Adkins MD [STAFF PHYSICIAN] - 2 Weeks


None,Stated [Primary Care Provider] - 1-2 days


Cem Adkins MD [STAFF PHYSICIAN] - 2 Weeks


Patient Instructions/Handouts:  Gastritis (DC)


Discharge Disposition: HOME SELF-CARE

## 2020-03-14 NOTE — P.PCN
Date of Procedure: 03/14/20


Procedure(s) Performed: 


Brief history:


Patient is a pleasant 64-year-old white female admitted hospital with severe 

symptomatic anemia and hemoglobin of 6.5 g/dL requiring 2 unit of blood 

transition.  She denies any GI symptoms.  She remains on aspirin and Plavix for 

coronary artery disease with recent stent placement in November 2019.  She is 

scheduled for an upper endoscopy as well as colonoscopy as a part of evaluation 

of severe iron deficiency anemia





Procedure performed:


Esophagogastroduodenoscopy


Colonoscopy





Preoperative diagnosis:


Iron deficiency anemia





Anesthesia: MAC





Procedure:


After informed consent was obtained from the patient  was brought into the 

endoscopy unit and IV  sedation was administered by anesthesia under continuous 

monitoring.  Initially upper endoscopy was done.  The Olympus  video 

endoscope was inserted inserted into the mouth and esophagus intubated without 

any difficulty and was gradually advanced into the stomach and duodenum and 

carefully examined.  The bulb and second part of the duodenum appeared normal.  

Mild duodenitis seen in the bulb of the duodenum The scope was then withdrawn 

into the stomach adequately insufflated with air and upon careful examination  

the antrum and multiple scattered erosions with no active bleeding.  The body, 

cardia and fundus appeared normal.  The scope was then withdrawn into the 

esophagus.  The GE junction was located at 40 cm to the incisors.  It appeared 

regular with no erythema erosions or ulcerations.  Rest of the esophagus 

appeared normal.  Patient tolerated the procedure well.





At this time the patient continued to remain sedation.  Initial digital rectal 

examination was normal.  Olympus  video colonoscope was then inserted into

the rectum and gradually advanced to the cecum without any difficulty.  Careful 

examination was performed as the scope was gradually being withdrawn.  The prep 

was excellent.  The cecum, ascending colon, transverse colon, descending colon, 

sigmoid colon and rectum appeared normal.  Retroflexion was performed in the 

rectum and no lesions were noted.  Patient tolerated the procedure well.





Impression:


1.  Upper endoscopy revealed antral erosive gastritis and duodenitis with no 

active bleeding


2.  Colonoscopy was within normal limits with no evidence of colitis or 

colorectal neoplasia








Recommendations:


Findings of this examination were discussed with the patient as well as her 

family.  She would be started on a regular diet and can be discharged home 

today.  She will continue with aspirin and Plavix.  She can start on iron 

supplements with iron sulfate 1 tablet twice daily.  She will continue with 

Protonix 40 mg daily and she'll be seen in office in 3-4 weeks

## 2023-07-07 ENCOUNTER — HOSPITAL ENCOUNTER (OUTPATIENT)
Dept: HOSPITAL 47 - EC | Age: 68
Setting detail: OBSERVATION
LOS: 3 days | Discharge: HOME | End: 2023-07-10
Attending: HOSPITALIST | Admitting: HOSPITALIST
Payer: MEDICARE

## 2023-07-07 DIAGNOSIS — D50.9: ICD-10-CM

## 2023-07-07 DIAGNOSIS — Z91.040: ICD-10-CM

## 2023-07-07 DIAGNOSIS — M19.041: ICD-10-CM

## 2023-07-07 DIAGNOSIS — Z87.01: ICD-10-CM

## 2023-07-07 DIAGNOSIS — Z82.49: ICD-10-CM

## 2023-07-07 DIAGNOSIS — F17.200: ICD-10-CM

## 2023-07-07 DIAGNOSIS — Z80.51: ICD-10-CM

## 2023-07-07 DIAGNOSIS — I10: ICD-10-CM

## 2023-07-07 DIAGNOSIS — Z95.5: ICD-10-CM

## 2023-07-07 DIAGNOSIS — Z88.2: ICD-10-CM

## 2023-07-07 DIAGNOSIS — Z79.82: ICD-10-CM

## 2023-07-07 DIAGNOSIS — K80.20: ICD-10-CM

## 2023-07-07 DIAGNOSIS — E78.5: ICD-10-CM

## 2023-07-07 DIAGNOSIS — Z88.5: ICD-10-CM

## 2023-07-07 DIAGNOSIS — Z98.890: ICD-10-CM

## 2023-07-07 DIAGNOSIS — M17.0: ICD-10-CM

## 2023-07-07 DIAGNOSIS — Z85.3: ICD-10-CM

## 2023-07-07 DIAGNOSIS — R07.89: Primary | ICD-10-CM

## 2023-07-07 DIAGNOSIS — R20.0: ICD-10-CM

## 2023-07-07 DIAGNOSIS — I25.2: ICD-10-CM

## 2023-07-07 DIAGNOSIS — Z91.048: ICD-10-CM

## 2023-07-07 DIAGNOSIS — Z14.01: ICD-10-CM

## 2023-07-07 DIAGNOSIS — M19.042: ICD-10-CM

## 2023-07-07 DIAGNOSIS — L30.9: ICD-10-CM

## 2023-07-07 DIAGNOSIS — Z98.51: ICD-10-CM

## 2023-07-07 DIAGNOSIS — Z92.3: ICD-10-CM

## 2023-07-07 DIAGNOSIS — Z79.899: ICD-10-CM

## 2023-07-07 DIAGNOSIS — K83.8: ICD-10-CM

## 2023-07-07 DIAGNOSIS — R74.8: ICD-10-CM

## 2023-07-07 DIAGNOSIS — E87.5: ICD-10-CM

## 2023-07-07 DIAGNOSIS — Z83.2: ICD-10-CM

## 2023-07-07 LAB
ALBUMIN SERPL-MCNC: 4.2 G/DL (ref 3.5–5)
ALP SERPL-CCNC: 104 U/L (ref 38–126)
ALT SERPL-CCNC: 18 U/L (ref 4–34)
ANION GAP SERPL CALC-SCNC: 6 MMOL/L
APTT BLD: 22.3 SEC (ref 22–30)
AST SERPL-CCNC: 26 U/L (ref 14–36)
BASOPHILS # BLD AUTO: 0 K/UL (ref 0–0.2)
BASOPHILS NFR BLD AUTO: 1 %
BUN SERPL-SCNC: 14 MG/DL (ref 7–17)
CALCIUM SPEC-MCNC: 9.3 MG/DL (ref 8.4–10.2)
CHLORIDE SERPL-SCNC: 106 MMOL/L (ref 98–107)
CO2 SERPL-SCNC: 25 MMOL/L (ref 22–30)
EOSINOPHIL # BLD AUTO: 0.3 K/UL (ref 0–0.7)
EOSINOPHIL NFR BLD AUTO: 4 %
ERYTHROCYTE [DISTWIDTH] IN BLOOD BY AUTOMATED COUNT: 4.51 M/UL (ref 3.8–5.4)
ERYTHROCYTE [DISTWIDTH] IN BLOOD: 13.3 % (ref 11.5–15.5)
GLUCOSE SERPL-MCNC: 103 MG/DL (ref 74–99)
HCT VFR BLD AUTO: 43.3 % (ref 34–46)
HGB BLD-MCNC: 13.9 GM/DL (ref 11.4–16)
INR PPP: 0.9 (ref ?–1.2)
LIPASE SERPL-CCNC: 301 U/L (ref 23–300)
LYMPHOCYTES # SPEC AUTO: 2 K/UL (ref 1–4.8)
LYMPHOCYTES NFR SPEC AUTO: 29 %
MAGNESIUM SPEC-SCNC: 1.9 MG/DL (ref 1.6–2.3)
MCH RBC QN AUTO: 30.8 PG (ref 25–35)
MCHC RBC AUTO-ENTMCNC: 32 G/DL (ref 31–37)
MCV RBC AUTO: 96.1 FL (ref 80–100)
MONOCYTES # BLD AUTO: 0.5 K/UL (ref 0–1)
MONOCYTES NFR BLD AUTO: 8 %
NEUTROPHILS # BLD AUTO: 4 K/UL (ref 1.3–7.7)
NEUTROPHILS NFR BLD AUTO: 57 %
PLATELET # BLD AUTO: 210 K/UL (ref 150–450)
POTASSIUM SERPL-SCNC: 5.2 MMOL/L (ref 3.5–5.1)
PROT SERPL-MCNC: 7.1 G/DL (ref 6.3–8.2)
PT BLD: 9.5 SEC (ref 9–12)
SODIUM SERPL-SCNC: 137 MMOL/L (ref 137–145)
WBC # BLD AUTO: 7.1 K/UL (ref 3.8–10.6)

## 2023-07-07 PROCEDURE — 80048 BASIC METABOLIC PNL TOTAL CA: CPT

## 2023-07-07 PROCEDURE — 80061 LIPID PANEL: CPT

## 2023-07-07 PROCEDURE — 71046 X-RAY EXAM CHEST 2 VIEWS: CPT

## 2023-07-07 PROCEDURE — 76705 ECHO EXAM OF ABDOMEN: CPT

## 2023-07-07 PROCEDURE — 93351 STRESS TTE COMPLETE: CPT

## 2023-07-07 PROCEDURE — 85025 COMPLETE CBC W/AUTO DIFF WBC: CPT

## 2023-07-07 PROCEDURE — 80053 COMPREHEN METABOLIC PANEL: CPT

## 2023-07-07 PROCEDURE — 96374 THER/PROPH/DIAG INJ IV PUSH: CPT

## 2023-07-07 PROCEDURE — 83735 ASSAY OF MAGNESIUM: CPT

## 2023-07-07 PROCEDURE — 83690 ASSAY OF LIPASE: CPT

## 2023-07-07 PROCEDURE — 83880 ASSAY OF NATRIURETIC PEPTIDE: CPT

## 2023-07-07 PROCEDURE — 99285 EMERGENCY DEPT VISIT HI MDM: CPT

## 2023-07-07 PROCEDURE — 84484 ASSAY OF TROPONIN QUANT: CPT

## 2023-07-07 PROCEDURE — 93005 ELECTROCARDIOGRAM TRACING: CPT

## 2023-07-07 PROCEDURE — 96372 THER/PROPH/DIAG INJ SC/IM: CPT

## 2023-07-07 PROCEDURE — 36415 COLL VENOUS BLD VENIPUNCTURE: CPT

## 2023-07-07 PROCEDURE — 85610 PROTHROMBIN TIME: CPT

## 2023-07-07 PROCEDURE — 85730 THROMBOPLASTIN TIME PARTIAL: CPT

## 2023-07-07 NOTE — ED
Chest Pain HPI





- General


Chief Complaint: Chest Pain


Stated Complaint: Chest Pain/Sob


Time Seen by Provider: 07/07/23 19:01


Source: patient, family


Mode of arrival: wheelchair


Limitations: no limitations





- History of Present Illness


Initial Comments: 


Patient is a pleasant 67-year-old female with a known history of coronary artery

disease status post stenting in 2019.  Patient's currently not on any 

anticoagulant medication she takes daily baby aspirin.  Patient presents the ER 

today with complaint of chest pain.  Pain has been intermittent since yesterday 

afternoon.  Pain is worse with a surgeon.  Pain is associated with shortness of 

breath no diaphoresis or lightheadedness.  Pain is in the left chest 

retrosternal and described as a pressure and feeling like she can't catch her 

breath.  No abdominal pain nausea or vomiting.  Patient reports a distant 

history of gallbladder issues never had to have surgery no history of 

pancreatitis.  She does admit to having 2 margaritas on the Fourth of July.








- Related Data


                                Home Medications











 Medication  Instructions  Recorded  Confirmed


 


Acetaminophen/Diphenhydramine 2 tab PO HS PRN 12/01/19 03/12/20





[Tylenol -25mg]   


 


Calcium Carbonate [Calcium] 1,200 mg PO DAILY 12/01/19 03/12/20


 


Cholecalciferol [Vitamin D3 (25 1,000 unit PO DAILY 12/01/19 03/12/20





Mcg = 1000 Iu)]   








                                  Previous Rx's











 Medication  Instructions  Recorded


 


Atorvastatin [Lipitor] 40 mg PO DAILY #90 tab 12/02/19


 


Clopidogrel [Plavix] 75 mg PO DAILY #90 tab 12/02/19


 


Metoprolol Tartrate [Lopressor] 25 mg PO BID #180 tab 12/02/19


 


Nitroglycerin Sl Tabs [Nitrostat] 0.4 mg SUBLINGUAL Q5M PRN #25 tab 12/02/19


 


Aspirin 81 mg PO DAILY #30 chew 03/14/20


 


Ferrous Sulfate [Feosol] 325 mg PO BID #60 tab 03/14/20


 


Omeprazole [PriLOSEC] 40 mg PO AC-BRKFST #30 capsule. 03/14/20











                                    Allergies











Allergy/AdvReac Type Severity Reaction Status Date / Time


 


Latex, Natural Rubber Allergy  Rash/Hives Verified 03/12/20 13:08


 


Sulfa (Sulfonamide Allergy  Rash/Hives. Verified 03/12/20 13:08





Antibiotics)   ITCHING  


 


tramadol AdvReac  Nausea & Verified 03/12/20 13:08





   Vomiting  


 


pressure bandage Allergy  Itching/soraya Uncoded 03/12/20 10:58





   h  














Review of Systems


ROS Statement: 


Those systems with pertinent positive or pertinent negative responses have been 

documented in the HPI.





ROS Other: All systems not noted in ROS Statement are negative.





EKG Findings





- EKG Comments:


EKG Findings:: EKG was obtained due to complaint of chest pain EKG was obtained 

at 1903 rate is 83, p wave before each QRS consistent with sinus, no acute ST 

elevations or depressions, no evidence of ischemia or infarction.





Past Medical History


Past Medical History: Coronary Artery Disease (CAD), Cancer, Hypertension, 

Myocardial Infarction (MI), Osteoarthritis (OA), Pneumonia


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 

cancer with advanced radiation per pt-21 treatments, iron anemia with past 

transfusions, bronchitis, arthritis bilateral hands/knees


Last Myocardial Infarction Date:: 2019


History of Any Multi-Drug Resistant Organisms: None Reported


Past Surgical History: Breast Surgery, Heart Catheterization With Stent, 

Tonsillectomy, Tubal Ligation


Additional Past Surgical History / Comment(s): 2016 L breast needle loc breast 

biopsy, 2016 L breast sentinel node dissection, D&C


Past Anesthesia/Blood Transfusion Reactions: No Reported Reaction


Additional Past Anesthesia/Blood Transfusion Reaction / Comment(s): HAS NEVER 

HAD GENERAL ANESTHESIA


Date of Last Stent Placement:: 2019


Past Psychological History: No Psychological Hx Reported


Past Alcohol Use History: Rare


Past Drug Use History: None Reported





- Past Family History


  ** Father


Additional Family Medical History / Comment(s): HEMPHILIA A.





  ** Daughter(s)


Additional Family Medical History / Comment(s): HEMOPHILIA A, 2 grandson's thru 

this edward have Hemophilia A





  ** Mother


Family Medical History: Cancer, Coronary Artery Disease (CAD)


Additional Family Medical History / Comment(s): KIDNEY CANCER





General Exam





- General Exam Comments


Initial Comments: 


Physical Exam


GENERAL:


Patient is well-developed and well-nourished.  Patient is nontoxic and well-

hydrated and is in no distress.





HENT:


Normocephalic, Atraumatic. 





EYES:


PERRL, EOMI





PULMONARY:


Unlabored respirations.  No audible rales rhonchi or wheezing was noted.





CARDIOVASCULAR:


There is a regular rate and rhythm without any murmurs gallops or rubs.  





ABDOMEN:


Soft and nontender with normal bowel sounds. 





SKIN:


Skin is clear with no lesions or rashes and otherwise unremarkable.





: 


Deferred





NEUROLOGIC:


Patient is alert and oriented x3.  Moving all extremities spontaneously





MUSCULOSKELETAL:


Normal extremities with adequate strength and full range of motion.  No lower 

extremity swelling or edema.  No calf tenderness.  





PSYCHIATRIC:


Normal psychiatric evaluation.





Limitations: no limitations





Course


                                   Vital Signs











  07/07/23 07/07/23 07/07/23





  18:54 19:10 19:22


 


Temperature 98.1 F  


 


Pulse Rate 93 87 


 


Pulse Rate [   77





Cardiac Monitor   





]   


 


Respiratory 16 19 





Rate   


 


Blood Pressure 111/70 124/71 


 


O2 Sat by Pulse 98 97 





Oximetry   














  07/07/23





  20:03


 


Temperature 98.0 F


 


Pulse Rate 76


 


Pulse Rate [ 





Cardiac Monitor 





] 


 


Respiratory 17





Rate 


 


Blood Pressure 107/60


 


O2 Sat by Pulse 97





Oximetry 














Chest Pain MDM





- [facility]


Patient was seen and evaluated history is obtained from patient and review of 

previous medical records she is 67-year-old with a history of coronary artery 

disease, smoker presenting with chest pain that she describes as the same as 

previous cardiac issues.  EKG was nonischemic.  Chest x-ray was unremarkable.  

Labs were remarkable only for mildly elevated lipase which could be related to 

recent alcohol intake.  Given the patient's risk factors and chest pain that is 

worse with exertion we will plan to admit for unstable angina.  Patient was 

agreeable to this.  Patient is chest pain-free at the time of admission.





Was pt. sent in by a medical professional or institution (, PA, NP, urgent 

care, hospital, or nursing home...) When possible be specific


@  -No


Did you speak to anyone other than the patient for history (EMS, parent, family,

police, friend...)? What history was obtained from this source 


@  - at bedside


Did you review nursing and triage notes (agree or disagree)?  Why? 


@  -I reviewed and agree with nursing and triage notes


Were old charts reviewed (outside hosp., previous admission, EMS record, old 

EKG, old radiological studies, urgent care reports/EKG's, nursing home records)?

Report findings 


@  -Previous admission for  NSTEMI in 2019 was reviewed


Differential Diagnosis (chest pain, altered mental status, abdominal pain women,

abdominal pain men, vaginal bleeding, weakness, fever, dyspnea, syncope, 

headache, dizziness, GI bleed, back pain, seizure, CVA, palpatations, mental 

health, musculoskeletal)? 


@  -Differential Chest Pain:


Stable Angina, Unstable Angina, STEMI, NSTEMI Aortic Dissection, Pneumothorax, 

Musculoskeletal, Esophageal Spasm GERD, Cholecystitis, Pancreatitis, Zoster, 

this is not meant to be an all-inclusive list. 


EKG interpreted by me (3pts min.).


@  -As above


X-rays interpreted by me (1pt min.).


@  -Chest x-ray was reviewed by myself I see no pneumothorax focal 

consolidations or widened mediastinum pending review by radiologist


CT interpreted by me (1pt min.).


@  -None done


U/S interpreted by me (1pt. min.).


@  -None done


What testing was considered but not performed or refused? (CT, X-rays, U/S, 

labs)? Why?


@  -Ultrasound the gallbladder ordered upon admission as well as trending 

troponins


What meds were considered but not given or refused? Why?


@  -Nitros considered but not given due to no active chest pain during 

evaluation


Did you discuss the management of the patient with other professionals 

(professionals i.e. , PA, NP, lab, RT, psych nurse, , , 

teacher, , )? Give summary


@  -And care discussed with admitting physician Dr. Navarro


Was smoking cessation discussed for >3mins.?


@  -Yes


Was critical care preformed (if so, how long)?


@  -No


Were there social determinants of health that impacted care today? How? 

(Homelessness, low income, unemployed, alcoholism, drug addiction, 

transportation, low edu. Level, literacy, decrease access to med. care, long term, 

rehab)?


@  -No


Was there de-escalation of care discussed even if they declined (Discuss DNR or 

withdrawal of care, Hospice)? DNR status


@  -No


What co-morbidities impacted this encounter? (DM, HTN, Smoking, COPD, CAD, 

Cancer, CVA, ARF, Chemo, Hep., AIDS, mental health diagnosis, sleep apnea, 

morbid obesity)?


@  -Smoking, CAD, history of breast cancer


Was patient admitted / discharged? Hospital course, mention meds given and 

route, prescriptions, significant lab abnormalities, going to OR and other 

pertinent info.


@  -Admit 


Undiagnosed new problem with uncertain prognosis?


@  No


Drug Therapy requiring intensive monitoring for toxicity (Heparin, Nitro, 

Insulin, Cardizem)?


@  -No


Were any procedures done?


@  -No


Diagnosis/symptom?


@  -Unstable angina


Acute, or Chronic, or Acute on Chronic?


@  -Acute


Uncomplicated (without systemic symptoms) or Complicated (systemic symptoms)?


@  -default


Side effects of treatment?


@  -No


Exacerbation, Progression, or Severe Exacerbation?


@  -No


Poses a threat to life or bodily function? How? (Chest pain, USA, MI, pneumonia,

PE, COPD, DKA, ARF, appy, cholecystitis, CVA, Diverticulitis, Homicidal, 

Suicidal, threat to staff... and all critical care pts)


@  -Yes








Disposition


Clinical Impression: 


 Unstable angina, Elevated lipase, Tobacco abuse, Chest pressure





Disposition: ADMITTED AS IP TO THIS [facility]


Condition: Serious


Is patient prescribed a controlled substance at d/c from ED?: No


Referrals: 


None,Stated [Primary Care Provider] - 1-2 days

## 2023-07-07 NOTE — XR
EXAMINATION TYPE: XR chest 2V

 

DATE OF EXAM: 7/7/2023 7:39 PM

 

COMPARISON: Chest radiographs from 3/12/2020

 

TECHNIQUE: XR chest 2V Frontal and lateral views of the chest.

 

CLINICAL INDICATION:Female, 67 years old with history of Chest Pain; 

 

FINDINGS: 

Lungs/Pleura: There is no evidence of pleural effusion, focal consolidation, or pneumothorax.  

Pulmonary vascularity: Unremarkable.

Heart/mediastinum: Cardiomediastinal silhouette is unremarkable.

Musculoskeletal: No acute osseous pathology.

 

 

IMPRESSION: 

No acute cardiopulmonary disease/process.

## 2023-07-08 LAB
CHOLEST SERPL-MCNC: 289 MG/DL (ref 0–200)
HDLC SERPL-MCNC: 56.2 MG/DL (ref 40–60)
LDLC SERPL CALC-MCNC: 179.6 MG/DL (ref 0–131)
TRIGL SERPL-MCNC: 266 MG/DL (ref 0–149)
VLDLC SERPL CALC-MCNC: 53.2 MG/DL (ref 5–40)

## 2023-07-08 RX ADMIN — Medication SCH MCG: at 20:30

## 2023-07-08 RX ADMIN — ASPIRIN 81 MG CHEWABLE TABLET SCH MG: 81 TABLET CHEWABLE at 20:30

## 2023-07-08 RX ADMIN — OXYCODONE HYDROCHLORIDE AND ACETAMINOPHEN SCH MG: 500 TABLET ORAL at 20:30

## 2023-07-08 RX ADMIN — HEPARIN SODIUM SCH UNIT: 5000 INJECTION INTRAVENOUS; SUBCUTANEOUS at 22:52

## 2023-07-08 RX ADMIN — HEPARIN SODIUM SCH UNIT: 5000 INJECTION INTRAVENOUS; SUBCUTANEOUS at 15:16

## 2023-07-08 RX ADMIN — HEPARIN SODIUM SCH UNIT: 5000 INJECTION INTRAVENOUS; SUBCUTANEOUS at 08:35

## 2023-07-08 RX ADMIN — Medication PRN MG: at 22:52

## 2023-07-08 NOTE — US
EXAMINATION TYPE: US gallbladder

 

DATE OF EXAM: 7/7/2023

 

COMPARISON: NONE

 

CLINICAL INDICATION: Female, 67 years old with history of elevated lipase; elevated liver enzymes 

 

TECHNIQUE: Multiple sonographic images of the right upper quadrant are obtained.

 

FINDINGS:

 

EXAM MEASUREMENTS:

 

Liver Length:  13.3 cm   

Gallbladder Wall:  0.2 cm   

CBD:  0.7 cm

Right Kidney:  10.4 x 3.9 x 4.8 cm

 

 

Pancreas:  Obscured by bowel gas

Liver:  appears wnl  

Gallbladder:  contracted with dense echogenic focus = 0.2cm 

**Evidence for sonographic Louis's sign:  no

CBD:  appears dilated 

Right Kidney:  no evidence of hydronephrosis  

 

The pancreas is obscured by overlying bowel gas. Liver appears unremarkable without focal lesion. Gal
lbladder is contracted with cholelithiasis demonstrated. No wall thickening or pericholecystic fluid.
 Per sonographer, negative sonographic Louis sign. Common bile duct is mildly prominent measuring up
 to 7 mm. Right kidney is unremarkable without evidence of hydronephrosis, nephrolithiasis, or solid 
mass.

 

IMPRESSION: 

 

1. Cholelithiasis without evidence for cholecystitis.

2. Mildly prominent common bile duct. Consider further evaluation with MRCP/ERCP as clinically indica
alberto if there is concern for choledocholithiasis.

## 2023-07-08 NOTE — P.PN
Subjective


Progress Note Date: 07/08/23





Hospital Course: 


67-year-old female with a PMH of CAD status post stenting who presented to the 

emergency room with complaints of chest discomfort. Chest x-ray in the emergency

room was unremarkable.  EKG revealed an ectopic atrial rhythm at 83 bpm with no 

ST/T-wave changes noted as reviewed by me.  Laboratory evaluation was reviewed 

with troponin less than 0.012, and lipase 301.  Hemoglobin was 13.9 with sodium 

137 and potassium 5.2.  Cardiology consulted, ACS ruled out.  Also has elevated 

lipase.  Gallbladder ultrasound that showed mildly dilated CBD, cholelithiasis. 

Surgery consulted.








Subjective: 


Seen and examined at bedside.  No acute events overnight.  Claims that her chest

pain has now resolved.  Able to tolerate oral intake.





Pertinent positives and negatives as discussed above, a complete review of 

systems was performed and all other systems are negative.








Vitals Signs Reviewed. 





General: nontoxic, no distress, appears at stated age


Derm: warm, dry


Head: atraumatic, normocephalic, symmetric


Eyes: EOMI, no lid lag, anicteric sclera


Mouth: no lip lesion, mucus membranes moist


Cardiovascular: S1S2 reg, no murmur


Lungs: CTA bilateral, no rhonchi, no rales , no accessory muscle use


Abdominal: soft,  nontender to palpation, no guarding, no appreciable organom

egaly


Ext: no gross muscle atrophy, no edema, no contractures


Neuro:  CN II-XI grossly intact, no focal neuro deficits


Psych: Alert, oriented, appropriate affect 





Data Reviewed Today: 


Pertinent Labs: Repeat BMP pending, will be reviewed when available, troponin 

negative 3


Imaging: Gallbladder ultrasound shows mildly dilated CBD, and cholelithiasis 

without cholecystitis





Assessment and Plan:





Chest pain, ACS ruled out


Possible symptomatic cholelithiasis


Mildly dilated CBD


Choledocholithiasis not ruled out


Mildly elevated lipase


Mild hyperkalemia





-Cardiology note reviewed, ACS has been ruled out, monitor for another 24 hours,

evaluated for GI causes for chest pain


-Gen. surgery consulted


-Currently chest pain-free


-Continue home medications


-Repeat BMP pending





DVT ppx: Heparin subcu





Code status:  Full code





Anticipated discharge place:  Home


Anticipated discharge time: 1-2 days








Objective





- Vital Signs


Vital signs: 


                                   Vital Signs











Temp  97.7 F   07/08/23 08:45


 


Pulse  63   07/08/23 08:45


 


Resp  15   07/08/23 08:45


 


BP  119/71   07/08/23 08:45


 


Pulse Ox  98   07/08/23 08:45


 


FiO2      








                                 Intake & Output











 07/07/23 07/08/23 07/08/23





 18:59 06:59 18:59


 


Weight 68.039 kg 68.039 kg 














- Labs


CBC & Chem 7: 


                                 07/07/23 19:11





                                 07/07/23 19:11


Labs: 


                  Abnormal Lab Results - Last 24 Hours (Table)











  07/07/23 Range/Units





  19:11 


 


Potassium  5.2 H  (3.5-5.1)  mmol/L


 


Glucose  103 H  (74-99)  mg/dL


 


Lipase  301 H  ()  U/L

## 2023-07-08 NOTE — P.CRDCN
History of Present Illness


Consult date: 07/08/23


Chief complaint: Chest discomfort


History of present illness: 





The patient is a pleasant 67-year-old female patient with a past medical history

significant for CAD with previous stenting of the RCA as well as hypertension 

and dyslipidemia and history of smoking.  The patient presented to the hospital 

complaining of chest discomfort.  She was in her usual state of health yesterday

when she started experiencing discomfort in the chest as a pressure/dull kind of

discomfort with radiation to the left arm as well as left arm numbness.  No pain

in the abdomen.  No shortness of breath beside her baseline shortness of breath.

 She is a smoker and has a component of COPD.  No dizziness or lightheadedness 

and no feeling of heart racing or fluttering and no presyncope or syncope.  She 

underwent further workup including an EKG and that showed sinus mechanism with 

no significant ST or T-wave abnormalities and she underwent 3 sets of cardiac 

enzymes came in to be unremarkable.  The chest x-ray did not show any acute 

abnormalities.  For some reason she ended having an ultrasound of the 

gallbladder and that showed gallbladder stone but not cholecystitis.  Further 

investigation was advised because the common bile duct was found to be dilated. 

During interviewing the patient she did have an episode of chest 

discomfort/pressure and I'm going to give her nitroglycerin.





The examination is remarkable for regular rhythm with clear breathing sounds 

bilaterally and no lower extremity edema and no skin rash.





Assessment


Chest discomfort


CAD was prior stenting of the RCA


Current smoker


Hypertension


Dyslipidemia


Gallbladder stone and common bile duct dilated patient





Plan


Acute coronary event was ruled out


I would advise monitor the patient for at least additional 24 hours


Rule out gastrointestinal etiology for the chest discomfort first


Follow-up with the patient





Past Medical History


Past Medical History: Coronary Artery Disease (CAD), Cancer, Hypertension, 

Myocardial Infarction (MI), Osteoarthritis (OA), Pneumonia


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 

cancer with advanced radiation per pt-21 treatments, iron anemia with past tra

nsfusions, bronchitis, arthritis bilateral hands/knees


Last Myocardial Infarction Date:: 2019


History of Any Multi-Drug Resistant Organisms: None Reported


Past Surgical History: Breast Surgery, Heart Catheterization With Stent, 

Tonsillectomy, Tubal Ligation


Additional Past Surgical History / Comment(s): 2016 L breast needle loc breast 

biopsy, 2016 L breast sentinel node dissection, D&C


Past Anesthesia/Blood Transfusion Reactions: No Reported Reaction


Additional Past Anesthesia/Blood Transfusion Reaction / Comment(s): HAS NEVER 

HAD GENERAL ANESTHESIA


Date of Last Stent Placement:: 2019


Past Psychological History: No Psychological Hx Reported


Additional Psychological History / Comment(s): Pt resides with her spouse. She 

is independent.


Smoking Status: Current every day smoker


Past Alcohol Use History: Rare


Additional Past Alcohol Use History / Comment(s): Pt started smoking in 1970 and

quit 3 days ago-had been cutting down since Dec, 2019.


Past Drug Use History: None Reported





- Past Family History


  ** Father


Additional Family Medical History / Comment(s): HEMPHILIA A.





  ** Daughter(s)


Additional Family Medical History / Comment(s): HEMOPHILIA A, 2 grandson's thru 

this edward have Hemophilia A





  ** Mother


Family Medical History: Cancer, Coronary Artery Disease (CAD)


Additional Family Medical History / Comment(s): KIDNEY CANCER





Medications and Allergies


                                Home Medications











 Medication  Instructions  Recorded  Confirmed  Type


 


Acetaminophen/Diphenhydramine 2 tab PO HS PRN 12/01/19 07/07/23 History





[Tylenol -25mg]    


 


Ascorbic Acid [Vitamin C chew] 500 mg PO HS 07/07/23 07/07/23 History


 


Aspirin 81 mg PO HS 07/07/23 07/07/23 History


 


Cholecalciferol [Vitamin D3 (25 25 mcg PO HS 07/07/23 07/07/23 History





Mcg = 1000 Iu)]    








                                    Allergies











Allergy/AdvReac Type Severity Reaction Status Date / Time


 


Latex, Natural Rubber Allergy  Rash/Hives Verified 07/07/23 21:41


 


Sulfa (Sulfonamide Allergy  Rash/Hives. Verified 07/07/23 21:41





Antibiotics)   ITCHING  


 


tramadol AdvReac  Nausea & Verified 07/07/23 21:41





   Vomiting  


 


pressure bandage Allergy  Itching/soraya Uncoded 03/12/20 10:58





   h  














Physical Exam


Vitals: 


                                   Vital Signs











  Temp Pulse Pulse Resp BP BP Pulse Ox


 


 07/08/23 08:45  97.7 F   63  15   119/71  98


 


 07/08/23 07:46   61   16  91/60   94 L


 


 07/08/23 05:24   82   18  119/64   98


 


 07/08/23 00:33   62   18  110/67   98


 


 07/07/23 23:16   64   17  106/54   98


 


 07/07/23 22:12  98.1 F  70   17  124/70   97


 


 07/07/23 20:03  98.0 F  76   17  107/60   97


 


 07/07/23 19:22    77    


 


 07/07/23 19:10   87   19  124/71   97


 


 07/07/23 18:54  98.1 F  93   16  111/70   98








                                Intake and Output











 07/07/23 07/08/23 07/08/23





 22:59 06:59 14:59


 


Other:   


 


  Weight 68.039 kg 68.039 kg 














Results





                                 07/07/23 19:11





                                 07/07/23 19:11


                                 Cardiac Enzymes











  07/07/23 07/07/23 07/07/23 Range/Units





  19:11 19:11 21:57 


 


AST  26    (14-36)  U/L


 


Troponin I   <0.012  <0.012  (0.000-0.034)  ng/mL














  07/08/23 Range/Units





  00:34 


 


AST   (14-36)  U/L


 


Troponin I  <0.012  (0.000-0.034)  ng/mL








                                   Coagulation











  07/07/23 Range/Units





  19:11 


 


PT  9.5  (9.0-12.0)  sec


 


APTT  22.3  (22.0-30.0)  sec








                                       CBC











  07/07/23 Range/Units





  19:11 


 


WBC  7.1  (3.8-10.6)  k/uL


 


RBC  4.51  (3.80-5.40)  m/uL


 


Hgb  13.9  (11.4-16.0)  gm/dL


 


Hct  43.3  (34.0-46.0)  %


 


Plt Count  210  (150-450)  k/uL








                          Comprehensive Metabolic Panel











  07/07/23 Range/Units





  19:11 


 


Sodium  137  (137-145)  mmol/L


 


Potassium  5.2 H  (3.5-5.1)  mmol/L


 


Chloride  106  ()  mmol/L


 


Carbon Dioxide  25  (22-30)  mmol/L


 


BUN  14  (7-17)  mg/dL


 


Creatinine  0.60  (0.52-1.04)  mg/dL


 


Glucose  103 H  (74-99)  mg/dL


 


Calcium  9.3  (8.4-10.2)  mg/dL


 


AST  26  (14-36)  U/L


 


ALT  18  (4-34)  U/L


 


Alkaline Phosphatase  104  ()  U/L


 


Total Protein  7.1  (6.3-8.2)  g/dL


 


Albumin  4.2  (3.5-5.0)  g/dL








                               Current Medications











Generic Name Dose Route Start Last Admin





  Trade Name Freq  PRN Reason Stop Dose Admin


 


Heparin Sodium (Porcine)  5,000 unit  07/08/23 08:00  07/08/23 08:35





  Heparin Sodium,Porcine/Pf 5,000 Unit/0.5 Ml Syringe  SQ   5,000 unit





  Q8HR LONG   Administration


 


Naloxone HCl  0.2 mg  07/07/23 21:01 





  Naloxone 0.4 Mg/Ml 1 Ml Vial  IVP  





  Q2M PRN  





  Opioid Reversal  








                                Intake and Output











 07/07/23 07/08/23 07/08/23





 22:59 06:59 14:59


 


Other:   


 


  Weight 68.039 kg 68.039 kg 








                                        





                                 07/07/23 19:11 





                                 07/07/23 19:11

## 2023-07-08 NOTE — P.HPIM
History of Present Illness


H&P Date: 07/07/23





The patient is a 67-year-old female with a PMH of CAD status post stenting who 

presented to the emergency room with complaints of chest discomfort.  The 

patient reports that she has been experiencing intermittent substernal and left 

lateral chest pain since yesterday morning.  She reports that the pain is 

occurring every hour, lasting for a few minutes at a time, 6 out of 10 on 

maximal intensity, somewhat alleviated with rest and worsened with exertion, 

feels with associated shortness of breath, and without associated abdominal 

pain, nausea, vomiting.  Patient denies history of all use but does admit to 

drinking 2 margaritas this Fourth of July.  Denies history of pancreatitis. 





Chest x-ray in the emergency room was unremarkable.  EKG revealed an ectopic 

atrial rhythm at 83 bpm with no ST/T-wave changes noted as reviewed by me.  

Laboratory evaluation was reviewed with troponin less than 0.012, and lipase 

301.  Hemoglobin was 13.9 with sodium 137 and potassium 5.2.





ED documentation reviewed and case discussed with ED provider. 





Review of systems:


Pertinent positives and negatives as discussed in HPI, a complete review of 

systems was performed and all other systems are negative.





Physical examination:


Vital signs reviewed


General: non toxic, no distress, appears at stated age, normal weight


Derm: no unusual rashes/lesions, warm


Head: atraumatic, normocephalic, symmetric


Eyes: EOMI, no lid lag, anicteric sclera, pupils equal round reactive to light


ENT: Nose and ears atraumatic


Neck: No cervical lymphadenopathy, trachea midline, supple


Mouth: no lip lesion, mucus membranes moist


Cardiovascular: S1S2 reg, no murmur, positive dorsalis pedis pulse bilateral, no

edema


Lungs: CTA bilateral, no rhonchi, no rales, no accessory muscle use


Abdominal: soft,  nontender to palpation, no guarding


Ext: muscle strength 5 out of 5 in all 4 extremities grossly, no gross muscle 

atrophy, no contractures, 


Neuro:  CN II-XI grossly intact, no gross focal neuro deficits


Psych: Alert, oriented, appropriate affect 





Assessment:





Chest pain, rule out ACS


Elevated lipase





Imaging:


Chest x-ray in the emergency room was unremarkable.  EKG revealed an ectopic 

atrial rhythm at 83 bpm with no ST/T-wave changes noted as reviewed by me. 





Data Review:


Laboratory evaluation was reviewed with troponin less than 0.012, and lipase 

301.  Hemoglobin was 13.9 with sodium 137 and potassium 5.2.





Plan:


Cardiology consulted


Cardiac monitoring


Trend troponin


Continue with aspirin, statin


Follow-up gallbladder ultrasound for elevated lipase





DVT prophylaxis: Heparin subq





The patient is admitted with an anticipated less than 2 midnight stay for 

evaluation of chest pain


CODE STATUS: Full Code


Discussed with: Patient


Anticipated discharge place: Home








Past Medical History


Past Medical History: Coronary Artery Disease (CAD), Cancer, Hypertension, 

Myocardial Infarction (MI), Osteoarthritis (OA), Pneumonia


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 

cancer with advanced radiation per pt-21 treatments, iron anemia with past 

transfusions, bronchitis, arthritis bilateral hands/knees


Last Myocardial Infarction Date:: 2019


History of Any Multi-Drug Resistant Organisms: None Reported


Past Surgical History: Breast Surgery, Heart Catheterization With Stent, 

Tonsillectomy, Tubal Ligation


Additional Past Surgical History / Comment(s): 2016 L breast needle loc breast 

biopsy, 2016 L breast sentinel node dissection, D&C


Past Anesthesia/Blood Transfusion Reactions: No Reported Reaction


Additional Past Anesthesia/Blood Transfusion Reaction / Comment(s): HAS NEVER 

HAD GENERAL ANESTHESIA


Date of Last Stent Placement:: 2019


Past Psychological History: No Psychological Hx Reported


Past Alcohol Use History: Rare


Past Drug Use History: None Reported





- Past Family History


  ** Father


Additional Family Medical History / Comment(s): HEMPHILIA A.





  ** Daughter(s)


Additional Family Medical History / Comment(s): HEMOPHILIA A, 2 grandson's thru 

this edward have Hemophilia A





  ** Mother


Family Medical History: Cancer, Coronary Artery Disease (CAD)


Additional Family Medical History / Comment(s): KIDNEY CANCER





Medications and Allergies


                                Home Medications











 Medication  Instructions  Recorded  Confirmed  Type


 


Acetaminophen/Diphenhydramine 2 tab PO HS PRN 12/01/19 07/07/23 History





[Tylenol -25mg]    


 


Ascorbic Acid [Vitamin C chew] 500 mg PO HS 07/07/23 07/07/23 History


 


Aspirin 81 mg PO HS 07/07/23 07/07/23 History


 


Cholecalciferol [Vitamin D3 (25 25 mcg PO HS 07/07/23 07/07/23 History





Mcg = 1000 Iu)]    








                                    Allergies











Allergy/AdvReac Type Severity Reaction Status Date / Time


 


Latex, Natural Rubber Allergy  Rash/Hives Verified 07/07/23 21:41


 


Sulfa (Sulfonamide Allergy  Rash/Hives. Verified 07/07/23 21:41





Antibiotics)   ITCHING  


 


tramadol AdvReac  Nausea & Verified 07/07/23 21:41





   Vomiting  


 


pressure bandage Allergy  Itching/soraya Uncoded 03/12/20 10:58





   h  














Physical Exam


Vitals: 


                                   Vital Signs











  Temp Pulse Pulse Resp BP Pulse Ox


 


 07/07/23 22:12  98.1 F  70   17  124/70  97


 


 07/07/23 20:03  98.0 F  76   17  107/60  97


 


 07/07/23 19:22    77   


 


 07/07/23 19:10   87   19  124/71  97


 


 07/07/23 18:54  98.1 F  93   16  111/70  98








                                Intake and Output











 07/07/23 07/07/23 07/08/23





 14:59 22:59 06:59


 


Other:   


 


  Weight  68.039 kg 














Results


CBC & Chem 7: 


                                 07/07/23 19:11





                                 07/07/23 19:11


Labs: 


                  Abnormal Lab Results - Last 24 Hours (Table)











  07/07/23 Range/Units





  19:11 


 


Potassium  5.2 H  (3.5-5.1)  mmol/L


 


Glucose  103 H  (74-99)  mg/dL


 


Lipase  301 H  ()  U/L [FreeTextEntry1] : "I'm here for my warfarin check " [de-identified] : patient presents to the office for PT/INR check, patient had been found with supratherapeutic INR, stopped Coumadin for 3 days, replete blood work came back with subtherapeutic INR, started on a new Coumadin schedule, comes in today for level check

## 2023-07-09 VITALS — RESPIRATION RATE: 16 BRPM

## 2023-07-09 LAB
ANION GAP SERPL CALC-SCNC: 3 MMOL/L
BUN SERPL-SCNC: 12 MG/DL (ref 7–17)
CALCIUM SPEC-MCNC: 9.5 MG/DL (ref 8.4–10.2)
CHLORIDE SERPL-SCNC: 106 MMOL/L (ref 98–107)
CO2 SERPL-SCNC: 33 MMOL/L (ref 22–30)
GLUCOSE SERPL-MCNC: 85 MG/DL (ref 74–99)
POTASSIUM SERPL-SCNC: 5.3 MMOL/L (ref 3.5–5.1)
SODIUM SERPL-SCNC: 142 MMOL/L (ref 137–145)

## 2023-07-09 RX ADMIN — Medication PRN MG: at 21:03

## 2023-07-09 RX ADMIN — Medication SCH MCG: at 19:54

## 2023-07-09 RX ADMIN — HEPARIN SODIUM SCH UNIT: 5000 INJECTION INTRAVENOUS; SUBCUTANEOUS at 19:54

## 2023-07-09 RX ADMIN — OXYCODONE HYDROCHLORIDE AND ACETAMINOPHEN SCH MG: 500 TABLET ORAL at 19:54

## 2023-07-09 RX ADMIN — ASPIRIN 81 MG CHEWABLE TABLET SCH MG: 81 TABLET CHEWABLE at 19:54

## 2023-07-09 RX ADMIN — HEPARIN SODIUM SCH UNIT: 5000 INJECTION INTRAVENOUS; SUBCUTANEOUS at 07:50

## 2023-07-09 RX ADMIN — METOPROLOL SUCCINATE SCH MG: 25 TABLET, EXTENDED RELEASE ORAL at 10:19

## 2023-07-09 RX ADMIN — HEPARIN SODIUM SCH UNIT: 5000 INJECTION INTRAVENOUS; SUBCUTANEOUS at 15:33

## 2023-07-09 NOTE — P.GSCN
History of Present Illness


Consult date: 07/09/23


Reason for Consult: 





Cholelithiasis


History of present illness: 





This 67-year-old female who's had some complaints of back pain.  Patient states 

that she does not have a history of back pain over the last 1-2 weeks she has 

developed some back pain.  The patient is also shows evidence of cholelithiasis.

 There is a mildly prominent common bile duct.  There is no evidence of biliary 

obstruction on ultrasound.  Her LFTs are normal.





Past Medical History


Past Medical History: Coronary Artery Disease (CAD), Cancer, Hypertension, 

Myocardial Infarction (MI), Osteoarthritis (OA), Pneumonia


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 

cancer with advanced radiation per pt-21 treatments, iron anemia with past 

transfusions, bronchitis, arthritis bilateral hands/knees


Last Myocardial Infarction Date:: 2019


History of Any Multi-Drug Resistant Organisms: None Reported


Past Surgical History: Breast Surgery, Heart Catheterization With Stent, 

Tonsillectomy, Tubal Ligation


Additional Past Surgical History / Comment(s): 2016 L breast needle loc breast 

biopsy, 2016 L breast sentinel node dissection, D&C


Past Anesthesia/Blood Transfusion Reactions: No Reported Reaction


Additional Past Anesthesia/Blood Transfusion Reaction / Comm: HAS NEVER HAD 

GENERAL ANESTHESIA


Date of Last Stent Placement:: 2019


Past Psychological History: No Psychological Hx Reported


Additional Psychological History / Comment(s): Pt resides with her spouse. She 

is independent.


Smoking Status: Current every day smoker


Past Alcohol Use History: Rare


Additional Past Alcohol Use History / Comment(s): Pt started smoking in 1970 and

quit 3 days ago-had been cutting down since Dec, 2019.


Past Drug Use History: None Reported





- Past Family History


  ** Father


Additional Family Medical History / Comment(s): HEMPHILIA A.





  ** Daughter(s)


Additional Family Medical History / Comment(s): HEMOPHILIA A, 2 grandson's thru 

this edward have Hemophilia A





  ** Mother


Family Medical History: Cancer, Coronary Artery Disease (CAD)


Additional Family Medical History / Comment(s): KIDNEY CANCER





Medications and Allergies


                                Home Medications











 Medication  Instructions  Recorded  Confirmed  Type


 


Acetaminophen/Diphenhydramine 2 tab PO HS PRN 12/01/19 07/07/23 History





[Tylenol -25mg]    


 


Ascorbic Acid [Vitamin C chew] 500 mg PO HS 07/07/23 07/07/23 History


 


Aspirin 81 mg PO HS 07/07/23 07/07/23 History


 


Cholecalciferol [Vitamin D3 (25 25 mcg PO HS 07/07/23 07/07/23 History





Mcg = 1000 Iu)]    








                                    Allergies











Allergy/AdvReac Type Severity Reaction Status Date / Time


 


Latex, Natural Rubber Allergy  Rash/Hives Verified 07/07/23 21:41


 


Sulfa (Sulfonamide Allergy  Rash/Hives. Verified 07/07/23 21:41





Antibiotics)   ITCHING  


 


tramadol AdvReac  Nausea & Verified 07/07/23 21:41





   Vomiting  


 


pressure bandage Allergy  Itching/soraya Uncoded 03/12/20 10:58





   h  














Surgical - Exam


                                   Vital Signs











Temp Pulse Resp BP Pulse Ox


 


 98.1 F   93   16   111/70   98 


 


 07/07/23 18:54  07/07/23 18:54  07/07/23 18:54  07/07/23 18:54  07/07/23 18:54














- General


well developed, well nourished, no distress





- Eyes


PERRL





- ENT


normal pinna





- Neck


no masses





- Respiratory


normal expansion





- Cardiovascular


Rhythm: regular





- Abdomen


Abdomen: soft, non tender





Results





- Labs





                                 07/07/23 19:11





                                 07/09/23 06:57


                  Abnormal Lab Results - Last 24 Hours (Table)











  07/07/23 07/09/23 Range/Units





  19:11 06:57 


 


Potassium   5.3 H  (3.5-5.1)  mmol/L


 


Carbon Dioxide   33 H  (22-30)  mmol/L


 


Triglycerides  266.00 H   (0..00)  mg/dL


 


Cholesterol  289.00 H   (0..00)  mg/dL


 


LDL Cholesterol, Calc  179.6 H   (0.0-131.0)  mg/dL


 


VLDL Cholesterol, Calc  53.20 H   (5.00-40.00)  mg/dL








                                 Diabetes panel











  07/07/23 07/09/23 Range/Units





  19:11 06:57 


 


Sodium   142  (137-145)  mmol/L


 


Potassium   5.3 H  (3.5-5.1)  mmol/L


 


Chloride   106  ()  mmol/L


 


Carbon Dioxide   33 H  (22-30)  mmol/L


 


BUN   12  (7-17)  mg/dL


 


Creatinine   0.59  (0.52-1.04)  mg/dL


 


Glucose   85  (74-99)  mg/dL


 


Calcium   9.5  (8.4-10.2)  mg/dL


 


Triglycerides  266.00 H   (0..00)  mg/dL


 


HDL Cholesterol  56.20   (40.00-60.00)  mg/dL








                                  Calcium panel











  07/09/23 Range/Units





  06:57 


 


Calcium  9.5  (8.4-10.2)  mg/dL








                                 Pituitary panel











  07/09/23 Range/Units





  06:57 


 


Sodium  142  (137-145)  mmol/L


 


Potassium  5.3 H  (3.5-5.1)  mmol/L


 


Chloride  106  ()  mmol/L


 


Carbon Dioxide  33 H  (22-30)  mmol/L


 


BUN  12  (7-17)  mg/dL


 


Creatinine  0.59  (0.52-1.04)  mg/dL


 


Glucose  85  (74-99)  mg/dL


 


Calcium  9.5  (8.4-10.2)  mg/dL








                                  Adrenal panel











  07/09/23 Range/Units





  06:57 


 


Sodium  142  (137-145)  mmol/L


 


Potassium  5.3 H  (3.5-5.1)  mmol/L


 


Chloride  106  ()  mmol/L


 


Carbon Dioxide  33 H  (22-30)  mmol/L


 


BUN  12  (7-17)  mg/dL


 


Creatinine  0.59  (0.52-1.04)  mg/dL


 


Glucose  85  (74-99)  mg/dL


 


Calcium  9.5  (8.4-10.2)  mg/dL














Assessment and Plan


Assessment: 





Cholelithiasis.  The patient's liver flush tests are normal.  I doubt that she 

has choledocholithiasis.  The patient should have her liver function tests 

repeated.  The patient appears to be completely asymptomatic from her 

cholecystitis per she has no significant abdominal pain.  The patient will be 

clinically observed.

## 2023-07-09 NOTE — P.PN
Subjective


Progress Note Date: 07/09/23


Hospital Course: 


67-year-old female with a PMH of CAD status post stenting who presented to the 

emergency room with complaints of chest discomfort. Chest x-ray in the emergency

room was unremarkable.  EKG revealed an ectopic atrial rhythm at 83 bpm with no 

ST/T-wave changes noted as reviewed by me.  Laboratory evaluation was reviewed 

with troponin less than 0.012, and lipase 301.  Hemoglobin was 13.9 with sodium 

137 and potassium 5.2.  Cardiology consulted, ACS ruled out.  Patient pending 

stress test tomorrow.  Also has elevated lipase.  Gallbladder ultrasound that 

showed mildly dilated CBD, cholelithiasis.  Concern for possible symptomatic 

cholelithiasis, and possible choledocholithiasis.  Surgery consulted.








Subjective: 


Seen and examined at bedside.  No acute events overnight.  Chest pain has now 

resolved.





Pertinent positives and negatives as discussed above, a complete review of 

systems was performed and all other systems are negative.








Vitals Signs Reviewed. 





General: nontoxic, no distress, appears at stated age


Derm: warm, dry


Head: atraumatic, normocephalic, symmetric


Eyes: EOMI, no lid lag, anicteric sclera


Mouth: no lip lesion, mucus membranes moist


Cardiovascular: S1S2 reg, no murmur


Lungs: CTA bilateral, no rhonchi, no rales , no accessory muscle use


Abdominal: soft,  nontender to palpation, no guarding, no appreciable 

organomegaly


Ext: no gross muscle atrophy, no edema, no contractures


Neuro:  CN II-XI grossly intact, no focal neuro deficits


Psych: Alert, oriented, appropriate affect 





Data Reviewed Today: 


Pertinent Labs: Potassium 5.3, creatinine 0.59


Imaging: No new imaging today





Assessment and Plan:





Chest pain, ACS ruled out


Possible symptomatic cholelithiasis


Mildly dilated CBD


Mildly elevated lipase


Mild hyperkalemia





-Cardiology note reviewed, ACS has been ruled out, monitor for another 24 hours,

stress test tomorrow


-Gen. surgery not reviewed, clinically observe, choledocholithiasis unlikely 

given normal liver function test, repeat CMP tomorrow


-Currently chest pain-free


-Continue home medications


-1 dose of lokelma given, recheck K tomorrow





DVT ppx: Heparin subcu





Code status:  Full code





Anticipated discharge place:  Home


Anticipated discharge time: Likely tomorrow if negative stress test





Objective





- Vital Signs


Vital signs: 


                                   Vital Signs











Temp  97.5 F L  07/09/23 07:00


 


Pulse  66   07/09/23 07:00


 


Resp  17   07/09/23 07:00


 


BP  127/74   07/09/23 07:00


 


Pulse Ox  100   07/09/23 07:00


 


FiO2      








                                 Intake & Output











 07/08/23 07/09/23 07/09/23





 18:59 06:59 18:59


 


Intake Total 591  188


 


Balance 591  188


 


Intake:   


 


  Oral 591  188


 


Other:   


 


  Voiding Method  Toilet Toilet


 


  # Voids 2 1 














- Labs


CBC & Chem 7: 


                                 07/07/23 19:11





                                 07/09/23 06:57


Labs: 


                  Abnormal Lab Results - Last 24 Hours (Table)











  07/07/23 07/09/23 Range/Units





  19:11 06:57 


 


Potassium   5.3 H  (3.5-5.1)  mmol/L


 


Carbon Dioxide   33 H  (22-30)  mmol/L


 


Triglycerides  266.00 H   (0..00)  mg/dL


 


Cholesterol  289.00 H   (0..00)  mg/dL


 


LDL Cholesterol, Calc  179.6 H   (0.0-131.0)  mg/dL


 


VLDL Cholesterol, Calc  53.20 H   (5.00-40.00)  mg/dL

## 2023-07-09 NOTE — P.PN
Subjective


Progress Note Date: 07/09/23


Principal diagnosis: 





CP





The patient is a pleasant 67-year-old female patient with a past medical history

significant for CAD with previous stenting of the RCA as well as hypertension 

and dyslipidemia and history of smoking.  The patient presented to the hospital 

complaining of chest discomfort.  She was in her usual state of health yesterday

when she started experiencing discomfort in the chest as a pressure/dull kind of

discomfort with radiation to the left arm as well as left arm numbness.  No pain

in the abdomen.  No shortness of breath beside her baseline shortness of breath.

 She is a smoker and has a component of COPD.  No dizziness or lightheadedness 

and no feeling of heart racing or fluttering and no presyncope or syncope.  She 

underwent further workup including an EKG and that showed sinus mechanism with 

no significant ST or T-wave abnormalities and she underwent 3 sets of cardiac 

enzymes came in to be unremarkable.  The chest x-ray did not show any acute 

abnormalities.  For some reason she ended having an ultrasound of the 

gallbladder and that showed gallbladder stone but not cholecystitis.  Further 

investigation was advised because the common bile duct was found to be dilated. 

During interviewing the patient she did have an episode of chest discom

fort/pressure and I'm going to give her nitroglycerin.





July 9 of 2023


The patient was seen and evaluated this morning.  She continues to have atypical

chest discomfort mostly on the left side of the chest radiating to the upper 

abdomen.  She was seen by the general surgery service iliac today and they felt 

that no acute intra-abdominal processes going on.  She will be seen by them 

tomorrow as well for possible further workup on the abdomen.  Meanwhile I'm 

going to pursue further cardiac testing including stress test to rule out severe

CAD.  She underwent fasting lipid profile which showed an LDL of 180 and she was

started on atorvastatin and 80 mg by mouth daily at bedtime.





The examination is remarkable for regular rhythm with clear breathing sounds 

bilaterally and no lower extremity edema and no skin rash.





Assessment


Chest discomfort


CAD was prior stenting of the RCA


Current smoker


Hypertension


Dyslipidemia


Gallbladder stone and common bile duct dilated patient





Plan


Acute coronary event was ruled out


I would advise monitor the patient for at least additional 24 hours


Obtain a stress test


Further workup from the general surgery team








Objective





- Vital Signs


Vital signs: 


                                   Vital Signs











Temp  97.5 F L  07/09/23 07:00


 


Pulse  66   07/09/23 07:00


 


Resp  17   07/09/23 07:00


 


BP  127/74   07/09/23 07:00


 


Pulse Ox  100   07/09/23 07:00


 


FiO2      








                                 Intake & Output











 07/08/23 07/09/23 07/09/23





 18:59 06:59 18:59


 


Intake Total 591  


 


Balance 591  


 


Intake:   


 


  Oral 591  


 


Other:   


 


  Voiding Method  Toilet Toilet


 


  # Voids 2 1 














- Labs


CBC & Chem 7: 


                                 07/07/23 19:11





                                 07/09/23 06:57


Labs: 


                  Abnormal Lab Results - Last 24 Hours (Table)











  07/07/23 07/09/23 Range/Units





  19:11 06:57 


 


Potassium   5.3 H  (3.5-5.1)  mmol/L


 


Carbon Dioxide   33 H  (22-30)  mmol/L


 


Triglycerides  266.00 H   (0..00)  mg/dL


 


Cholesterol  289.00 H   (0..00)  mg/dL


 


LDL Cholesterol, Calc  179.6 H   (0.0-131.0)  mg/dL


 


VLDL Cholesterol, Calc  53.20 H   (5.00-40.00)  mg/dL

## 2023-07-10 VITALS — SYSTOLIC BLOOD PRESSURE: 92 MMHG | TEMPERATURE: 97.7 F | HEART RATE: 68 BPM | DIASTOLIC BLOOD PRESSURE: 59 MMHG

## 2023-07-10 LAB
ANION GAP SERPL CALC-SCNC: 10 MMOL/L (ref 4–12)
BUN SERPL-SCNC: 13.3 MG/DL (ref 9–27)
BUN/CREAT SERPL: 19 RATIO (ref 12–20)
CALCIUM SPEC-MCNC: 9.4 MG/DL (ref 8.7–10.3)
CHLORIDE SERPL-SCNC: 106 MMOL/L (ref 96–109)
CO2 SERPL-SCNC: 26 MMOL/L (ref 21.6–31.8)
GLUCOSE SERPL-MCNC: 91 MG/DL (ref 70–110)
POTASSIUM SERPL-SCNC: 4.8 MMOL/L (ref 3.5–5.5)
SODIUM SERPL-SCNC: 142 MMOL/L (ref 135–145)

## 2023-07-10 RX ADMIN — METOPROLOL SUCCINATE SCH MG: 25 TABLET, EXTENDED RELEASE ORAL at 10:50

## 2023-07-10 RX ADMIN — HEPARIN SODIUM SCH: 5000 INJECTION INTRAVENOUS; SUBCUTANEOUS at 15:49

## 2023-07-10 RX ADMIN — HEPARIN SODIUM SCH UNIT: 5000 INJECTION INTRAVENOUS; SUBCUTANEOUS at 10:50

## 2023-07-10 NOTE — PN
PROGRESS NOTE



SUBJECTIVE:

Marcy is a 67-year-old lady, who is admitted to hospital with chest pain and ruled

out for myocardial infarction.  She is to undergo a stress test today and if necessary

undergo cardiac catheterization, if not, could discharge home.  She is free of

symptoms.



OBJECTIVE:

GENERAL: Comfortable at rest.

VITAL SIGNS: Stable.

CHEST: Reveals good air entry bilaterally.

HEART: Reveals first and second heart sounds.  No gallop.  No murmur. No rub.

ABDOMEN:  Soft, nontender.

EXTREMITIES:  Did not reveal any edema.  Peripheral pulses are felt.



LABORATORY DATA:

Show that the potassium is 4.8, creatinine is 0.7.  LDL cholesterol is elevated at 179.



She is currently on aspirin, and Toprol-XL 25 mg daily, started on statin in the

outpatient setup after she has tried to bring down the cholesterol with diet, exercise,

and weight loss.



ASSESSMENT:

Chest pain, rule out coronary artery disease.



PLAN:

The patient will undergo a stress test today and further decisions based on stress test

findings.





MMODL / IJN: [id number] / Job#: [id number]

## 2023-07-10 NOTE — P.DS
Providers


Date of admission: 


07/07/23 21:03





Expected date of discharge: 07/10/23


Attending physician: 


Angela Navarro MD





Consults: 





                                        





07/07/23 21:02


Consult Physician Routine 


   Consulting Provider: [facility]


   Consult Reason/Comments: Chest Pain


   Do you want consulting provider notified?: Yes, Notify in am





07/08/23 12:26


Consult Physician Routine 


   Consulting Provider: Noel Granger


   Consult Reason/Comments: dilated CBD, possibly symptomatic cholelithiasis


   Do you want consulting provider notified?: Yes





07/10/23 14:07


Consult Physician Routine 


   Consulting Provider: Mateo Crenshaw


   Consult Reason/Comments: dilated CBD, possibly symptomatic cholelithiasis


   Do you want consulting provider notified?: Already Contacted











Primary care physician: 


Stated None





Hospital Course: 


Discharge Diagnosis:


Chest pain, acute coronary syndrome ruled out


Dyslipidemia


Acute dermatitis, contact versus dobutamine exposure.


Cholelithiasis


Hyperkalemia, resolved





Hospital Course: 


Patient is a 67-year-old female with known coronary artery disease status post 

PCI, hypertension, hemophilia a, and prior breast cancer who presented to the ER

complaints of chest pain.  Patient was admitted as observation to rule out acute

coronary syndrome.  After admission her troponin remained negative.  EKG showed 

no ischemia.  Telemetry was rather unremarkable.  She was seen by cardiology.  

She underwent a dobutamine stress test on 7/10 which showed appropriate response

to dobutamine with no wall motion abnormalities.  During the stress test she did

have an ALLERGIC reaction near the IV site which responded to IV Benadryl and 

hydrocortisone cream.  Cardiology recommended continued outpatient follow-up.





She also underwent a gallbladder ultrasound during her hospital stay which 

showed cholelithiasis without evidence of acute cholecystitis with some mild 

prominence common bile duct.  Patients liver enzymes were normal on admission. 

She as seen by surgery who did not recommned urgeny or emergent intervention.  

She was determined stable for discharge home with outpatient follow-up with 

surgery.





Follow-up: Dr. Ray for primary care physician as patient has none, she will 

be started on Lipito for dyslipidemia, She should follow winba Crenshaw for 

her cholelithiasis as an outpatient. She will estabilsh with Dr. Mejias for 

further work-up.   








Patient seen and examined at bedside. No chest pain, SOB, or nausea. Right arm 

with swelling adnerythem- she states that she reacts to many things and this is 

not uncommon for her. 37 





Vital signs reviewed and stable. 


General: nontoxic, no distress, appears at stated age


Gen.: The right arm with mild swelling and erythema proximal to the IV site, no 

warmth


Cardiovascular: S1S2 reg, no murmur, positive posterior tibial pulse bilateral, 


Lungs: CTA bilateral, no rhonchi, no rales , no accessory muscle use


Abdominal: soft,  nontender to palpation, no guarding, no appreciable 

organomegaly


Ext: no gross muscle atrophy, no edema b/l lower extremities, no contractures


Neuro:  CN II-XI grossly intact, no focal neuro deficits


Psych: Alert, oriented, appropriate affect 








A total of 27 minutes of time were spent preparing this complex discharge 

summary.


Patient was discharged on 7/10/23. 





This dictation was prepared using dragon medical voice recognition software. 

Though every attempt is made to correct errors during dictation some may still 

exist. 





Patient Condition at Discharge: Stable





Plan - Discharge Summary


New Discharge Prescriptions: 


New


   Atorvastatin Calcium [Lipitor] 40 mg PO DAILY #30 tab





Continue


   Acetaminophen/Diphenhydramine [Tylenol -25mg] 2 tab PO HS PRN


     PRN Reason: sleep/pain


   Cholecalciferol [Vitamin D3 (25 Mcg = 1000 Iu)] 25 mcg PO HS


   Aspirin 81 mg PO HS


   Ascorbic Acid [Vitamin C chew] 500 mg PO HS


Discharge Medication List





Acetaminophen/Diphenhydramine [Tylenol -25mg] 2 tab PO HS PRN 12/01/19 

[History]


Ascorbic Acid [Vitamin C chew] 500 mg PO HS 07/07/23 [History]


Aspirin 81 mg PO HS 07/07/23 [History]


Cholecalciferol [Vitamin D3 (25 Mcg = 1000 Iu)] 25 mcg PO HS 07/07/23 [History]


Atorvastatin Calcium [Lipitor] 40 mg PO DAILY #30 tab 07/10/23 [Rx]








Follow up Appointment(s)/Referral(s): 


Mateo Crenshaw MD [STAFF PHYSICIAN] - 1 Week


Marquis Mejias MD [STAFF PHYSICIAN] - 1 Week


Sergio Ray MD [STAFF PHYSICIAN] - 1 Week (to establish as PCP)


Activity/Diet/Wound Care/Special Instructions: 


Activity:


As tolerated 





Diet: 


Heart healthy 








Special Instructions: 


Your dobutamine stress test was negative. Please return to the emergency 

department with recurrent chest pain. 


You have a gallstone that should eventually come out. 


Discharge Disposition: HOME SELF-CARE

## 2023-07-10 NOTE — P.PN
Subjective


Progress Note Date: 07/10/23





CHIEF COMPLAINT: Gallstones





HISTORY OF PRESENT ILLNESS: Patient denies any abdominal pain.  Denies any na

usea or vomiting.  Tolerating diet.  She is scheduled for a stress test today.  

Afebrile.  LFTs pending.  Possible discharge today





PHYSICAL EXAM: 


VITAL SIGNS: Reviewed.


GENERAL: Well-developed in no acute distress. 


HEENT:  No sclera icterus. Extraocular movements grossly intact.  Moist buccal 

mucosa. Head is atraumatic, normocephalic. 


ABDOMEN:  Soft.  Nondistended. Nontender. 


NEUROLOGIC: Alert and oriented. Cranial nerves II through XII grossly intact.





ASSESSMENT: 


1.  Cholelithiasis.  Doubt choledocholithiasis.  Patient's LFTs are normal.  No 

abdominal pain








PLAN: 


-Patient scheduled for stress test today with cardiology service


-No surgical intervention planned


-Patient can be discharged from surgical standpoint when medically cleared


-Follow up on LFTs





Physician Assistant note has been reviewed by physician. Signing provider agrees

with the documented findings, assessment, and plan of care. 





Objective





- Vital Signs


Vital signs: 


                                   Vital Signs











Temp  97.4 F L  07/10/23 06:35


 


Pulse  66   07/10/23 06:35


 


Resp  16   07/10/23 06:35


 


BP  105/58   07/10/23 06:35


 


Pulse Ox  99   07/10/23 06:35


 


FiO2      








                                 Intake & Output











 07/09/23 07/10/23 07/10/23





 18:59 06:59 18:59


 


Intake Total 1370  


 


Balance 1370  


 


Intake:   


 


  Oral 1370  


 


Other:   


 


  Voiding Method Toilet Toilet 


 


  # Voids 3 1 














- Labs


CBC & Chem 7: 


                                 07/07/23 19:11





                                 07/10/23 05:27

## 2023-07-10 NOTE — CA
Dobutamine Stress Echocardiogram Report 

 

 Marcy Garcia 

 

 Age:    67     Gender:    F 

 

 :   1955 

 Exam Date:     07/10/2023 09:13 

 

 Exam Location: [facility] 

 

 Ordering Physician:        Marquis Mejias MD ([id number]) 

 

 Referring Physician:       JUANPABLO,, 

 

 Technician:                Ashley Dumont RDCS 

 

 Technologist: 

 

 MRN:    [id number]                 Ht (in):    63 

 

 Wt (lb):     150 

 

 Procedure CPT: 

 

 Indication:        Chest Pain 

 

 ICD-9 Codes: 

 

 Rhythm: 

 

 Patient History:                      Atypical angina, Dyspnea/SOB,  

                                       Recent MI, Post PTCA 

 

 Cardiac Medications: 

 

 Medications in past 24 hours: 

 

 Contrast: 

 

 Total Dose (mL): 

 

 Stress Results 

 

 Protocol:     Dobutamine 

 

 Peak Dose (???g/kg/min):        40 

 

 Duration (min:sec): 

 

 Atropine:(mg)        0.5 

 

 Target HR:           132 

 

 Double Product:      01200 

 

 Resting HR:      64      Resting BP:      100   /   58 

 

 Peak HR:     141      Peak BP:    152    /   45 

 

 Max Predicted HR:       155 

 

 91    % Max Predicted HR 

 

 Stress Summary: 

 

 

 BP Response:                   Normal 

 

 Reason for Termination:        Exceeded target heart rate (85% max  

                                predicted) 

 

 Cardiac Symptoms:              Test terminated after reaching target  

                                heart rate (85% max predicted) 

 

 ECG Analysis 

 

 Resting EKG: 

 Normal sinus rhythm normal axis normal intervals 

 

 Stress EKG: 

 Patient was given intravenous dobutamine or a period of 12  

 minutes as per protocol also received 0.5 mg of atropine  

 achieving 85% of predicted maximal heart rate without chest pain  

 or diagnostic ST segment depression 

 

 Arrhythmia: 

 Frequent PVCs were noted 

 Echo Analysis 

 Base Echo Analysis: 

 Baseline echo shows normal left ventricular size wall motion  

 systolic function 

 

 Low Echo Anaylsis: 

 Normal 

 

 Peak Echo Analysis: 

 Normal hyperdynamic response 

 

 Recovery Echo: 

 

 Normal 

 

 MEASUREMENTS   (Male/Female) Normal Values 

 

 

 

 

 

 CONCLUSIONS 

 Negative dobutamine stress echo 

 

 Dr. Cem Adkins MD 

 (Electronically Signed) 

 Final Date:      10 July 2023 12:47

## 2024-09-03 ENCOUNTER — HOSPITAL ENCOUNTER (OUTPATIENT)
Dept: HOSPITAL 47 - RADXRYALE | Age: 69
Discharge: HOME | End: 2024-09-03
Attending: INTERNAL MEDICINE
Payer: MEDICARE

## 2024-09-03 DIAGNOSIS — M19.011: Primary | ICD-10-CM

## 2024-09-03 DIAGNOSIS — M19.012: ICD-10-CM

## 2024-09-03 DIAGNOSIS — M25.711: ICD-10-CM

## 2024-09-03 DIAGNOSIS — M25.712: ICD-10-CM

## 2024-09-03 NOTE — XR
EXAMINATION TYPE: XR shoulder complete BILAT

 

DATE OF EXAM: 9/3/2024 5:02 PM

 

CLINICAL INDICATION: Female, 68 years old with history of [id number] JONATHON SHLD PAIN; [facility]

 

COMPARISON: None

 

TECHNIQUE: XR shoulder complete BILAT; examined in AP, internally rotated and scapular Y projections.
 

 

FINDINGS: 

No evidence of acute osseous pathology, joint dislocation, or soft tissue swelling.  The remaining po
rtions of the visualized chest are unremarkable.  Degeneration changes of the acromion, distal clavic
le with osteophyte formation. There is osteophyte formation of the glenoid and humeral head. There is
 joint space narrowing of glenohumeral joint.

 

IMPRESSION: 

1. No acute osseous pathology.

2. Mild shoulder osteoarthrosis.